# Patient Record
Sex: FEMALE | Race: WHITE | NOT HISPANIC OR LATINO | Employment: FULL TIME | URBAN - METROPOLITAN AREA
[De-identification: names, ages, dates, MRNs, and addresses within clinical notes are randomized per-mention and may not be internally consistent; named-entity substitution may affect disease eponyms.]

---

## 2023-11-20 DIAGNOSIS — E66.01 OBESITY, MORBID (MULTI): ICD-10-CM

## 2023-11-27 NOTE — PROGRESS NOTES
CLINICALLY SIGNIFICANT SLEEP DISORDER WAS NOT IDENTIFIED ON SLEEP STUDY FROM [facility]

## 2023-11-27 NOTE — PROGRESS NOTES
Pre-Operative Dietary Education     Current Weight:   Current BMI:     In class settings, reviewed thin liquids diet that patient will be required to follow for 2 weeks after surgery. Reviewed low calorie fluids along with protein shakes and products that can be consumed. Emphasized the importance of following diet guidelines and recommendations after surgery to prevent complications. Addressed liquids and items to avoid at this time. Patients are educated to drink at least 50 oz of fluid per day, and gradually take in 50g protein per day. Briefly reviewed the diet progression for the next 3-4 months, which will also be discussed at each follow up appointment after surgery. Also reviewed supplementation after bariatric surgery. Patient was instructed to take chewable MVI with iron once daily for the first 6 weeks after surgery. Other supplementation will be introduced at 6 week follow up appointment.       Loida Apple RD, LDN  Registered Dietitian, Licensed Dietitian Nutritionist

## 2023-11-28 ENCOUNTER — OFFICE VISIT (OUTPATIENT)
Dept: SURGERY | Facility: CLINIC | Age: 23
End: 2023-11-28
Payer: COMMERCIAL

## 2023-11-28 ENCOUNTER — CLINICAL SUPPORT (OUTPATIENT)
Dept: SURGERY | Facility: CLINIC | Age: 23
End: 2023-11-28
Payer: COMMERCIAL

## 2023-11-28 VITALS
WEIGHT: 288 LBS | RESPIRATION RATE: 16 BRPM | SYSTOLIC BLOOD PRESSURE: 126 MMHG | HEART RATE: 101 BPM | HEIGHT: 60 IN | DIASTOLIC BLOOD PRESSURE: 88 MMHG | BODY MASS INDEX: 56.54 KG/M2

## 2023-11-28 VITALS — BODY MASS INDEX: 56.72 KG/M2 | WEIGHT: 288 LBS

## 2023-11-28 DIAGNOSIS — I10 PRIMARY HYPERTENSION: ICD-10-CM

## 2023-11-28 DIAGNOSIS — Z71.9 ENCOUNTER FOR EDUCATION: Primary | ICD-10-CM

## 2023-11-28 DIAGNOSIS — Z01.818 PREOP EXAMINATION: ICD-10-CM

## 2023-11-28 DIAGNOSIS — J45.909 ASTHMA, UNSPECIFIED ASTHMA SEVERITY, UNSPECIFIED WHETHER COMPLICATED, UNSPECIFIED WHETHER PERSISTENT (HHS-HCC): ICD-10-CM

## 2023-11-28 PROCEDURE — 3074F SYST BP LT 130 MM HG: CPT | Performed by: INTERNAL MEDICINE

## 2023-11-28 PROCEDURE — 1036F TOBACCO NON-USER: CPT | Performed by: INTERNAL MEDICINE

## 2023-11-28 PROCEDURE — 3079F DIAST BP 80-89 MM HG: CPT | Performed by: INTERNAL MEDICINE

## 2023-11-28 PROCEDURE — 99024 POSTOP FOLLOW-UP VISIT: CPT

## 2023-11-28 PROCEDURE — 99213 OFFICE O/P EST LOW 20 MIN: CPT | Performed by: INTERNAL MEDICINE

## 2023-11-28 RX ORDER — ALBUTEROL SULFATE 90 UG/1
2 AEROSOL, METERED RESPIRATORY (INHALATION) EVERY 4 HOURS PRN
COMMUNITY
Start: 2016-08-10

## 2023-11-28 RX ORDER — METFORMIN HYDROCHLORIDE 500 MG/1
500 TABLET ORAL
COMMUNITY
End: 2023-12-12 | Stop reason: ALTCHOICE

## 2023-11-28 RX ORDER — PANTOPRAZOLE SODIUM 20 MG/1
20 TABLET, DELAYED RELEASE ORAL EVERY 24 HOURS
COMMUNITY
Start: 2022-02-24 | End: 2023-12-14 | Stop reason: HOSPADM

## 2023-11-28 RX ORDER — GUANFACINE 2 MG/1
2 TABLET, EXTENDED RELEASE ORAL
COMMUNITY
Start: 2023-11-24 | End: 2023-12-12 | Stop reason: ALTCHOICE

## 2023-11-28 ASSESSMENT — ENCOUNTER SYMPTOMS
ABDOMINAL PAIN: 0
SHORTNESS OF BREATH: 0
DIARRHEA: 0
DIZZINESS: 0
DEPRESSION: 0
OCCASIONAL FEELINGS OF UNSTEADINESS: 0
CONSTIPATION: 0
ARTHRALGIAS: 0
PALPITATIONS: 0
NAUSEA: 0
LOSS OF SENSATION IN FEET: 0
COUGH: 0

## 2023-11-28 ASSESSMENT — PATIENT HEALTH QUESTIONNAIRE - PHQ9
1. LITTLE INTEREST OR PLEASURE IN DOING THINGS: NOT AT ALL
SUM OF ALL RESPONSES TO PHQ9 QUESTIONS 1 AND 2: 0
2. FEELING DOWN, DEPRESSED OR HOPELESS: NOT AT ALL

## 2023-11-28 ASSESSMENT — PAIN SCALES - GENERAL: PAINLEVEL: 2

## 2023-11-28 NOTE — PROGRESS NOTES
Pre-Operative Dietary Education     Current Weight: 288 lbs   Current BMI: 56.72    In class settings, reviewed thin liquids diet that patient will be required to follow for 2 weeks after surgery. Reviewed low calorie fluids along with protein shakes and products that can be consumed. Emphasized the importance of following diet guidelines and recommendations after surgery to prevent complications. Addressed liquids and items to avoid at this time. Patients are educated to drink at least 50 oz of fluid per day, and gradually take in 50g protein per day. Briefly reviewed the diet progression for the next 3-4 months, which will also be discussed at each follow up appointment after surgery. Also reviewed supplementation after bariatric surgery. Patient was instructed to take chewable MVI with iron once daily for the first 6 weeks after surgery. Other supplementation will be introduced at 6 week follow up appointment.     Jennifer Chan, MS, RD, LD

## 2023-11-28 NOTE — PATIENT INSTRUCTIONS
Counseled on low calorie diet and regular exercise. Please discuss with your psychiatrist about Guanfacine to make sure is immediate release. The day before surgery take all meds as usually except no Metformin, take  no meds the day of surgery. She is medically clear for surgery pending lab results

## 2023-11-28 NOTE — PROGRESS NOTES
Subjective   Patient ID: Jennifer Lane is a 23 y.o. female who presents for preoperative clearance for gastric bypass surgery. Currently has 3 meals a day. Breakfast includes protein shakes. She is trying to limit carbs and have protein with each meal.  . She limits snacks. Drinks..  more water. . Regarding exercise, she walks for 30 min daily or uses her exercise bike.     Diagnostics Reviewed: 3/2023 EKG NSR, sleep study neg  Labs Reviewed: 3/3023 WNL         Review of Systems   Respiratory:  Negative for cough and shortness of breath.    Cardiovascular:  Negative for chest pain and palpitations.   Gastrointestinal:  Negative for abdominal pain, constipation, diarrhea and nausea.   Musculoskeletal:  Negative for arthralgias.   Neurological:  Negative for dizziness.       Objective   There were no vitals taken for this visit.    Physical Exam  Constitutional:       Appearance: She is obese.   HENT:      Mouth/Throat:      Comments: Dentition ok  Cardiovascular:      Rate and Rhythm: Normal rate and regular rhythm.   Pulmonary:      Breath sounds: Normal breath sounds.   Abdominal:      General: Bowel sounds are normal.      Palpations: Abdomen is soft.   Musculoskeletal:         General: No swelling.   Neurological:      Mental Status: She is alert.         Assessment/Plan   Diagnoses and all orders for this visit:  Preop examination  -     Comprehensive Metabolic Panel; Future  -     CBC and Auto Differential; Future  Primary hypertension  Asthma, unspecified asthma severity, unspecified whether complicated, unspecified whether persistent  -     Comprehensive Metabolic Panel; Future  -     CBC and Auto Differential; Future

## 2023-11-29 ENCOUNTER — LAB (OUTPATIENT)
Dept: LAB | Facility: LAB | Age: 23
End: 2023-11-29
Payer: COMMERCIAL

## 2023-11-29 DIAGNOSIS — Z01.818 PREOP EXAMINATION: ICD-10-CM

## 2023-11-29 DIAGNOSIS — J45.909 ASTHMA, UNSPECIFIED ASTHMA SEVERITY, UNSPECIFIED WHETHER COMPLICATED, UNSPECIFIED WHETHER PERSISTENT (HHS-HCC): ICD-10-CM

## 2023-11-29 LAB
ALBUMIN SERPL-MCNC: 4.3 G/DL (ref 3.5–5)
ALP BLD-CCNC: 87 U/L (ref 35–125)
ALT SERPL-CCNC: 11 U/L (ref 5–40)
ANION GAP SERPL CALC-SCNC: 10 MMOL/L
AST SERPL-CCNC: 12 U/L (ref 5–40)
BASOPHILS # BLD AUTO: 0.03 X10*3/UL (ref 0–0.1)
BASOPHILS NFR BLD AUTO: 0.5 %
BILIRUB SERPL-MCNC: 0.5 MG/DL (ref 0.1–1.2)
BUN SERPL-MCNC: 16 MG/DL (ref 8–25)
CALCIUM SERPL-MCNC: 9.4 MG/DL (ref 8.5–10.4)
CHLORIDE SERPL-SCNC: 102 MMOL/L (ref 97–107)
CO2 SERPL-SCNC: 28 MMOL/L (ref 24–31)
CREAT SERPL-MCNC: 0.7 MG/DL (ref 0.4–1.6)
EOSINOPHIL # BLD AUTO: 0.09 X10*3/UL (ref 0–0.7)
EOSINOPHIL NFR BLD AUTO: 1.6 %
ERYTHROCYTE [DISTWIDTH] IN BLOOD BY AUTOMATED COUNT: 12.7 % (ref 11.5–14.5)
GFR SERPL CREATININE-BSD FRML MDRD: >90 ML/MIN/1.73M*2
GLUCOSE SERPL-MCNC: 107 MG/DL (ref 65–99)
HCT VFR BLD AUTO: 42 % (ref 36–46)
HGB BLD-MCNC: 13.9 G/DL (ref 12–16)
IMM GRANULOCYTES # BLD AUTO: 0.01 X10*3/UL (ref 0–0.7)
IMM GRANULOCYTES NFR BLD AUTO: 0.2 % (ref 0–0.9)
LYMPHOCYTES # BLD AUTO: 2.76 X10*3/UL (ref 1.2–4.8)
LYMPHOCYTES NFR BLD AUTO: 48.9 %
MCH RBC QN AUTO: 30.1 PG (ref 26–34)
MCHC RBC AUTO-ENTMCNC: 33.1 G/DL (ref 32–36)
MCV RBC AUTO: 91 FL (ref 80–100)
MONOCYTES # BLD AUTO: 0.46 X10*3/UL (ref 0.1–1)
MONOCYTES NFR BLD AUTO: 8.2 %
NEUTROPHILS # BLD AUTO: 2.29 X10*3/UL (ref 1.2–7.7)
NEUTROPHILS NFR BLD AUTO: 40.6 %
NRBC BLD-RTO: 0 /100 WBCS (ref 0–0)
PLATELET # BLD AUTO: 330 X10*3/UL (ref 150–450)
POTASSIUM SERPL-SCNC: 4.1 MMOL/L (ref 3.4–5.1)
PROT SERPL-MCNC: 7.1 G/DL (ref 5.9–7.9)
RBC # BLD AUTO: 4.62 X10*6/UL (ref 4–5.2)
SODIUM SERPL-SCNC: 140 MMOL/L (ref 133–145)
WBC # BLD AUTO: 5.6 X10*3/UL (ref 4.4–11.3)

## 2023-11-29 PROCEDURE — 36415 COLL VENOUS BLD VENIPUNCTURE: CPT

## 2023-11-29 PROCEDURE — 80053 COMPREHEN METABOLIC PANEL: CPT

## 2023-11-29 PROCEDURE — 85025 COMPLETE CBC W/AUTO DIFF WBC: CPT

## 2023-12-13 ENCOUNTER — ANESTHESIA EVENT (OUTPATIENT)
Dept: OPERATING ROOM | Facility: HOSPITAL | Age: 23
DRG: 621 | End: 2023-12-13
Payer: COMMERCIAL

## 2023-12-13 ENCOUNTER — HOSPITAL ENCOUNTER (INPATIENT)
Facility: HOSPITAL | Age: 23
LOS: 1 days | Discharge: HOME | DRG: 621 | End: 2023-12-14
Attending: SURGERY | Admitting: SURGERY
Payer: COMMERCIAL

## 2023-12-13 ENCOUNTER — ANESTHESIA (OUTPATIENT)
Dept: OPERATING ROOM | Facility: HOSPITAL | Age: 23
DRG: 621 | End: 2023-12-13
Payer: COMMERCIAL

## 2023-12-13 DIAGNOSIS — J45.20 MILD INTERMITTENT ASTHMA WITHOUT COMPLICATION (HHS-HCC): ICD-10-CM

## 2023-12-13 DIAGNOSIS — E66.01 MORBID OBESITY WITH BMI OF 40.0-44.9, ADULT (MULTI): Primary | ICD-10-CM

## 2023-12-13 DIAGNOSIS — K44.9 HIATAL HERNIA: ICD-10-CM

## 2023-12-13 DIAGNOSIS — K21.9 GASTROESOPHAGEAL REFLUX DISEASE WITHOUT ESOPHAGITIS: ICD-10-CM

## 2023-12-13 DIAGNOSIS — E66.01 OBESITY, MORBID (MULTI): ICD-10-CM

## 2023-12-13 DIAGNOSIS — Z91.89 AT RISK FOR DEEP VENOUS THROMBOSIS: ICD-10-CM

## 2023-12-13 DIAGNOSIS — Z98.84 S/P BARIATRIC SURGERY: ICD-10-CM

## 2023-12-13 PROBLEM — J45.909 ASTHMA (HHS-HCC): Status: ACTIVE | Noted: 2023-12-13

## 2023-12-13 LAB
PLATELET # BLD AUTO: 390 X10*3/UL (ref 150–450)
PREGNANCY TEST URINE, POC: NEGATIVE

## 2023-12-13 PROCEDURE — 2500000004 HC RX 250 GENERAL PHARMACY W/ HCPCS (ALT 636 FOR OP/ED): Performed by: ANESTHESIOLOGIST ASSISTANT

## 2023-12-13 PROCEDURE — A43644 PR LAP GASTRIC BYPASS/ROUX-EN-Y: Performed by: ANESTHESIOLOGY

## 2023-12-13 PROCEDURE — 7100000001 HC RECOVERY ROOM TIME - INITIAL BASE CHARGE: Performed by: SURGERY

## 2023-12-13 PROCEDURE — 81025 URINE PREGNANCY TEST: CPT

## 2023-12-13 PROCEDURE — 3600000009 HC OR TIME - EACH INCREMENTAL 1 MINUTE - PROCEDURE LEVEL FOUR: Performed by: SURGERY

## 2023-12-13 PROCEDURE — 96372 THER/PROPH/DIAG INJ SC/IM: CPT

## 2023-12-13 PROCEDURE — 0BQT4ZZ REPAIR DIAPHRAGM, PERCUTANEOUS ENDOSCOPIC APPROACH: ICD-10-PCS | Performed by: SURGERY

## 2023-12-13 PROCEDURE — 1100000001 HC PRIVATE ROOM DAILY

## 2023-12-13 PROCEDURE — 2500000004 HC RX 250 GENERAL PHARMACY W/ HCPCS (ALT 636 FOR OP/ED)

## 2023-12-13 PROCEDURE — 3700000002 HC GENERAL ANESTHESIA TIME - EACH INCREMENTAL 1 MINUTE: Performed by: SURGERY

## 2023-12-13 PROCEDURE — 2500000005 HC RX 250 GENERAL PHARMACY W/O HCPCS: Performed by: INTERNAL MEDICINE

## 2023-12-13 PROCEDURE — 96372 THER/PROPH/DIAG INJ SC/IM: CPT | Performed by: SURGERY

## 2023-12-13 PROCEDURE — 2500000004 HC RX 250 GENERAL PHARMACY W/ HCPCS (ALT 636 FOR OP/ED): Performed by: SURGERY

## 2023-12-13 PROCEDURE — 3600000004 HC OR TIME - INITIAL BASE CHARGE - PROCEDURE LEVEL FOUR: Performed by: SURGERY

## 2023-12-13 PROCEDURE — 3700000001 HC GENERAL ANESTHESIA TIME - INITIAL BASE CHARGE: Performed by: SURGERY

## 2023-12-13 PROCEDURE — 7100000002 HC RECOVERY ROOM TIME - EACH INCREMENTAL 1 MINUTE: Performed by: SURGERY

## 2023-12-13 PROCEDURE — 0DB64Z3 EXCISION OF STOMACH, PERCUTANEOUS ENDOSCOPIC APPROACH, VERTICAL: ICD-10-PCS | Performed by: SURGERY

## 2023-12-13 PROCEDURE — 85049 AUTOMATED PLATELET COUNT: CPT

## 2023-12-13 PROCEDURE — A4649 SURGICAL SUPPLIES: HCPCS | Performed by: SURGERY

## 2023-12-13 PROCEDURE — C9113 INJ PANTOPRAZOLE SODIUM, VIA: HCPCS

## 2023-12-13 PROCEDURE — 2500000004 HC RX 250 GENERAL PHARMACY W/ HCPCS (ALT 636 FOR OP/ED): Performed by: SPECIALIST

## 2023-12-13 PROCEDURE — 43281 LAP PARAESOPHAG HERN REPAIR: CPT | Performed by: SURGERY

## 2023-12-13 PROCEDURE — 2500000005 HC RX 250 GENERAL PHARMACY W/O HCPCS: Performed by: SURGERY

## 2023-12-13 PROCEDURE — 99222 1ST HOSP IP/OBS MODERATE 55: CPT | Performed by: INTERNAL MEDICINE

## 2023-12-13 PROCEDURE — A43644 PR LAP GASTRIC BYPASS/ROUX-EN-Y: Performed by: ANESTHESIOLOGIST ASSISTANT

## 2023-12-13 PROCEDURE — 2720000007 HC OR 272 NO HCPCS: Performed by: SURGERY

## 2023-12-13 PROCEDURE — 43644 LAP GASTRIC BYPASS/ROUX-EN-Y: CPT | Performed by: SURGERY

## 2023-12-13 PROCEDURE — 36415 COLL VENOUS BLD VENIPUNCTURE: CPT

## 2023-12-13 PROCEDURE — 2500000005 HC RX 250 GENERAL PHARMACY W/O HCPCS: Performed by: ANESTHESIOLOGIST ASSISTANT

## 2023-12-13 RX ORDER — SODIUM CHLORIDE, SODIUM LACTATE, POTASSIUM CHLORIDE, CALCIUM CHLORIDE 600; 310; 30; 20 MG/100ML; MG/100ML; MG/100ML; MG/100ML
100 INJECTION, SOLUTION INTRAVENOUS CONTINUOUS
Status: DISCONTINUED | OUTPATIENT
Start: 2023-12-13 | End: 2023-12-13

## 2023-12-13 RX ORDER — LIDOCAINE HYDROCHLORIDE 10 MG/ML
0.1 INJECTION INFILTRATION; PERINEURAL ONCE
Status: DISCONTINUED | OUTPATIENT
Start: 2023-12-13 | End: 2023-12-13 | Stop reason: HOSPADM

## 2023-12-13 RX ORDER — FENTANYL CITRATE 50 UG/ML
25 INJECTION, SOLUTION INTRAMUSCULAR; INTRAVENOUS EVERY 5 MIN PRN
Status: DISCONTINUED | OUTPATIENT
Start: 2023-12-13 | End: 2023-12-13 | Stop reason: HOSPADM

## 2023-12-13 RX ORDER — SODIUM CHLORIDE, SODIUM LACTATE, POTASSIUM CHLORIDE, CALCIUM CHLORIDE 600; 310; 30; 20 MG/100ML; MG/100ML; MG/100ML; MG/100ML
125 INJECTION, SOLUTION INTRAVENOUS CONTINUOUS
Status: DISCONTINUED | OUTPATIENT
Start: 2023-12-13 | End: 2023-12-14 | Stop reason: HOSPADM

## 2023-12-13 RX ORDER — DIPHENHYDRAMINE HYDROCHLORIDE 50 MG/ML
12.5 INJECTION INTRAMUSCULAR; INTRAVENOUS ONCE AS NEEDED
Status: DISCONTINUED | OUTPATIENT
Start: 2023-12-13 | End: 2023-12-13 | Stop reason: HOSPADM

## 2023-12-13 RX ORDER — ONDANSETRON HYDROCHLORIDE 2 MG/ML
INJECTION, SOLUTION INTRAVENOUS AS NEEDED
Status: DISCONTINUED | OUTPATIENT
Start: 2023-12-13 | End: 2023-12-13

## 2023-12-13 RX ORDER — HEPARIN SODIUM 5000 [USP'U]/ML
5000 INJECTION, SOLUTION INTRAVENOUS; SUBCUTANEOUS ONCE
Status: COMPLETED | OUTPATIENT
Start: 2023-12-13 | End: 2023-12-13

## 2023-12-13 RX ORDER — LIDOCAINE HYDROCHLORIDE 10 MG/ML
INJECTION, SOLUTION EPIDURAL; INFILTRATION; INTRACAUDAL; PERINEURAL AS NEEDED
Status: DISCONTINUED | OUTPATIENT
Start: 2023-12-13 | End: 2023-12-13

## 2023-12-13 RX ORDER — HYDRALAZINE HYDROCHLORIDE 20 MG/ML
5 INJECTION INTRAMUSCULAR; INTRAVENOUS EVERY 4 HOURS PRN
Status: DISCONTINUED | OUTPATIENT
Start: 2023-12-13 | End: 2023-12-13

## 2023-12-13 RX ORDER — PROPOFOL 10 MG/ML
INJECTION, EMULSION INTRAVENOUS AS NEEDED
Status: DISCONTINUED | OUTPATIENT
Start: 2023-12-13 | End: 2023-12-13

## 2023-12-13 RX ORDER — CEFAZOLIN SODIUM IN 0.9 % NACL 3 G/100 ML
3 INTRAVENOUS SOLUTION, PIGGYBACK (ML) INTRAVENOUS ONCE
Status: COMPLETED | OUTPATIENT
Start: 2023-12-13 | End: 2023-12-13

## 2023-12-13 RX ORDER — METOCLOPRAMIDE HYDROCHLORIDE 5 MG/ML
10 INJECTION INTRAMUSCULAR; INTRAVENOUS EVERY 6 HOURS PRN
Status: DISCONTINUED | OUTPATIENT
Start: 2023-12-13 | End: 2023-12-14 | Stop reason: HOSPADM

## 2023-12-13 RX ORDER — FENTANYL CITRATE 50 UG/ML
INJECTION, SOLUTION INTRAMUSCULAR; INTRAVENOUS AS NEEDED
Status: DISCONTINUED | OUTPATIENT
Start: 2023-12-13 | End: 2023-12-13

## 2023-12-13 RX ORDER — ACETAMINOPHEN 10 MG/ML
1000 INJECTION, SOLUTION INTRAVENOUS EVERY 6 HOURS
Status: DISCONTINUED | OUTPATIENT
Start: 2023-12-13 | End: 2023-12-14

## 2023-12-13 RX ORDER — METOPROLOL TARTRATE 1 MG/ML
5 INJECTION, SOLUTION INTRAVENOUS EVERY 6 HOURS SCHEDULED
Status: DISCONTINUED | OUTPATIENT
Start: 2023-12-13 | End: 2023-12-14

## 2023-12-13 RX ORDER — METOPROLOL TARTRATE 1 MG/ML
INJECTION, SOLUTION INTRAVENOUS AS NEEDED
Status: DISCONTINUED | OUTPATIENT
Start: 2023-12-13 | End: 2023-12-13

## 2023-12-13 RX ORDER — LABETALOL HYDROCHLORIDE 5 MG/ML
INJECTION, SOLUTION INTRAVENOUS AS NEEDED
Status: DISCONTINUED | OUTPATIENT
Start: 2023-12-13 | End: 2023-12-13

## 2023-12-13 RX ORDER — DEXAMETHASONE SODIUM PHOSPHATE 4 MG/ML
INJECTION, SOLUTION INTRA-ARTICULAR; INTRALESIONAL; INTRAMUSCULAR; INTRAVENOUS; SOFT TISSUE AS NEEDED
Status: DISCONTINUED | OUTPATIENT
Start: 2023-12-13 | End: 2023-12-13

## 2023-12-13 RX ORDER — LABETALOL HYDROCHLORIDE 5 MG/ML
5 INJECTION, SOLUTION INTRAVENOUS ONCE AS NEEDED
Status: DISCONTINUED | OUTPATIENT
Start: 2023-12-13 | End: 2023-12-13 | Stop reason: HOSPADM

## 2023-12-13 RX ORDER — SCOLOPAMINE TRANSDERMAL SYSTEM 1 MG/1
1 PATCH, EXTENDED RELEASE TRANSDERMAL ONCE
Status: DISCONTINUED | OUTPATIENT
Start: 2023-12-13 | End: 2023-12-13

## 2023-12-13 RX ORDER — ENOXAPARIN SODIUM 100 MG/ML
40 INJECTION SUBCUTANEOUS DAILY
Status: DISCONTINUED | OUTPATIENT
Start: 2023-12-14 | End: 2023-12-14 | Stop reason: HOSPADM

## 2023-12-13 RX ORDER — HEPARIN SODIUM 5000 [USP'U]/ML
5000 INJECTION, SOLUTION INTRAVENOUS; SUBCUTANEOUS EVERY 8 HOURS SCHEDULED
Status: COMPLETED | OUTPATIENT
Start: 2023-12-13 | End: 2023-12-13

## 2023-12-13 RX ORDER — BUPRENORPHINE HYDROCHLORIDE 0.32 MG/ML
0.1 INJECTION INTRAMUSCULAR; INTRAVENOUS EVERY 6 HOURS PRN
Status: DISCONTINUED | OUTPATIENT
Start: 2023-12-13 | End: 2023-12-14 | Stop reason: HOSPADM

## 2023-12-13 RX ORDER — PANTOPRAZOLE SODIUM 40 MG/10ML
40 INJECTION, POWDER, LYOPHILIZED, FOR SOLUTION INTRAVENOUS DAILY
Status: DISCONTINUED | OUTPATIENT
Start: 2023-12-14 | End: 2023-12-14 | Stop reason: HOSPADM

## 2023-12-13 RX ORDER — ROCURONIUM BROMIDE 10 MG/ML
INJECTION, SOLUTION INTRAVENOUS AS NEEDED
Status: DISCONTINUED | OUTPATIENT
Start: 2023-12-13 | End: 2023-12-13

## 2023-12-13 RX ORDER — NEOSTIGMINE METHYLSULFATE 1 MG/ML
INJECTION, SOLUTION INTRAVENOUS AS NEEDED
Status: DISCONTINUED | OUTPATIENT
Start: 2023-12-13 | End: 2023-12-13

## 2023-12-13 RX ORDER — ONDANSETRON HYDROCHLORIDE 2 MG/ML
4 INJECTION, SOLUTION INTRAVENOUS ONCE AS NEEDED
Status: DISCONTINUED | OUTPATIENT
Start: 2023-12-13 | End: 2023-12-13 | Stop reason: HOSPADM

## 2023-12-13 RX ORDER — METOCLOPRAMIDE HYDROCHLORIDE 5 MG/ML
10 INJECTION INTRAMUSCULAR; INTRAVENOUS ONCE
Status: COMPLETED | OUTPATIENT
Start: 2023-12-13 | End: 2023-12-13

## 2023-12-13 RX ORDER — NALOXONE HYDROCHLORIDE 0.4 MG/ML
0.2 INJECTION, SOLUTION INTRAMUSCULAR; INTRAVENOUS; SUBCUTANEOUS EVERY 5 MIN PRN
Status: DISCONTINUED | OUTPATIENT
Start: 2023-12-13 | End: 2023-12-14 | Stop reason: HOSPADM

## 2023-12-13 RX ORDER — GLYCOPYRROLATE 0.2 MG/ML
INJECTION INTRAMUSCULAR; INTRAVENOUS AS NEEDED
Status: DISCONTINUED | OUTPATIENT
Start: 2023-12-13 | End: 2023-12-13

## 2023-12-13 RX ORDER — HYDRALAZINE HYDROCHLORIDE 20 MG/ML
5 INJECTION INTRAMUSCULAR; INTRAVENOUS EVERY 4 HOURS PRN
Status: DISCONTINUED | OUTPATIENT
Start: 2023-12-13 | End: 2023-12-14 | Stop reason: HOSPADM

## 2023-12-13 RX ORDER — ACETAMINOPHEN 10 MG/ML
1000 INJECTION, SOLUTION INTRAVENOUS ONCE
Status: COMPLETED | OUTPATIENT
Start: 2023-12-13 | End: 2023-12-13

## 2023-12-13 RX ORDER — PANTOPRAZOLE SODIUM 40 MG/10ML
40 INJECTION, POWDER, LYOPHILIZED, FOR SOLUTION INTRAVENOUS ONCE
Status: COMPLETED | OUTPATIENT
Start: 2023-12-13 | End: 2023-12-13

## 2023-12-13 RX ORDER — ONDANSETRON HYDROCHLORIDE 2 MG/ML
4 INJECTION, SOLUTION INTRAVENOUS EVERY 8 HOURS PRN
Status: DISCONTINUED | OUTPATIENT
Start: 2023-12-13 | End: 2023-12-14 | Stop reason: HOSPADM

## 2023-12-13 RX ORDER — SODIUM CHLORIDE, SODIUM LACTATE, POTASSIUM CHLORIDE, CALCIUM CHLORIDE 600; 310; 30; 20 MG/100ML; MG/100ML; MG/100ML; MG/100ML
100 INJECTION, SOLUTION INTRAVENOUS CONTINUOUS
Status: DISCONTINUED | OUTPATIENT
Start: 2023-12-13 | End: 2023-12-13 | Stop reason: HOSPADM

## 2023-12-13 RX ORDER — MIDAZOLAM HYDROCHLORIDE 1 MG/ML
INJECTION, SOLUTION INTRAMUSCULAR; INTRAVENOUS AS NEEDED
Status: DISCONTINUED | OUTPATIENT
Start: 2023-12-13 | End: 2023-12-13

## 2023-12-13 RX ADMIN — GLYCOPYRROLATE 0.4 MG: 0.2 INJECTION INTRAMUSCULAR; INTRAVENOUS at 15:43

## 2023-12-13 RX ADMIN — METOCLOPRAMIDE 10 MG: 5 INJECTION, SOLUTION INTRAMUSCULAR; INTRAVENOUS at 10:30

## 2023-12-13 RX ADMIN — SODIUM CHLORIDE, SODIUM LACTATE, POTASSIUM CHLORIDE, AND CALCIUM CHLORIDE 100 ML/HR: 600; 310; 30; 20 INJECTION, SOLUTION INTRAVENOUS at 10:27

## 2023-12-13 RX ADMIN — FENTANYL CITRATE 50 MCG: 50 INJECTION, SOLUTION INTRAMUSCULAR; INTRAVENOUS at 14:14

## 2023-12-13 RX ADMIN — HEPARIN SODIUM 5000 UNITS: 5000 INJECTION, SOLUTION INTRAVENOUS; SUBCUTANEOUS at 21:13

## 2023-12-13 RX ADMIN — LABETALOL HYDROCHLORIDE 5 MG: 5 INJECTION, SOLUTION INTRAVENOUS at 15:02

## 2023-12-13 RX ADMIN — NEOSTIGMINE METHYLSULFATE 2 MG: 1 INJECTION, SOLUTION INTRAVENOUS at 15:43

## 2023-12-13 RX ADMIN — FENTANYL CITRATE 25 MCG: 50 INJECTION, SOLUTION INTRAMUSCULAR; INTRAVENOUS at 15:54

## 2023-12-13 RX ADMIN — FENTANYL CITRATE 25 MCG: 50 INJECTION, SOLUTION INTRAMUSCULAR; INTRAVENOUS at 15:51

## 2023-12-13 RX ADMIN — ROCURONIUM BROMIDE 10 MG: 10 INJECTION, SOLUTION INTRAVENOUS at 13:41

## 2023-12-13 RX ADMIN — MIDAZOLAM 1 MG: 1 INJECTION INTRAMUSCULAR; INTRAVENOUS at 11:56

## 2023-12-13 RX ADMIN — FENTANYL CITRATE 50 MCG: 50 INJECTION, SOLUTION INTRAMUSCULAR; INTRAVENOUS at 14:49

## 2023-12-13 RX ADMIN — PANTOPRAZOLE SODIUM 40 MG: 40 INJECTION, POWDER, FOR SOLUTION INTRAVENOUS at 10:28

## 2023-12-13 RX ADMIN — ROCURONIUM BROMIDE 50 MG: 10 INJECTION, SOLUTION INTRAVENOUS at 11:59

## 2023-12-13 RX ADMIN — LABETALOL HYDROCHLORIDE 5 MG: 5 INJECTION, SOLUTION INTRAVENOUS at 15:28

## 2023-12-13 RX ADMIN — LIDOCAINE HYDROCHLORIDE 5 ML: 10 INJECTION, SOLUTION EPIDURAL; INFILTRATION; INTRACAUDAL; PERINEURAL at 11:53

## 2023-12-13 RX ADMIN — FENTANYL CITRATE 50 MCG: 50 INJECTION, SOLUTION INTRAMUSCULAR; INTRAVENOUS at 14:59

## 2023-12-13 RX ADMIN — FENTANYL CITRATE 50 MCG: 50 INJECTION, SOLUTION INTRAMUSCULAR; INTRAVENOUS at 13:43

## 2023-12-13 RX ADMIN — METOPROLOL TARTRATE 3 MG: 5 INJECTION, SOLUTION INTRAVENOUS at 13:47

## 2023-12-13 RX ADMIN — ROCURONIUM BROMIDE 5 MG: 10 INJECTION, SOLUTION INTRAVENOUS at 15:22

## 2023-12-13 RX ADMIN — FENTANYL CITRATE 25 MCG: 0.05 INJECTION, SOLUTION INTRAMUSCULAR; INTRAVENOUS at 17:00

## 2023-12-13 RX ADMIN — SODIUM CHLORIDE, SODIUM LACTATE, POTASSIUM CHLORIDE, AND CALCIUM CHLORIDE: 600; 310; 30; 20 INJECTION, SOLUTION INTRAVENOUS at 13:29

## 2023-12-13 RX ADMIN — METOPROLOL TARTRATE 2 MG: 5 INJECTION, SOLUTION INTRAVENOUS at 13:25

## 2023-12-13 RX ADMIN — FENTANYL CITRATE 25 MCG: 0.05 INJECTION, SOLUTION INTRAMUSCULAR; INTRAVENOUS at 16:32

## 2023-12-13 RX ADMIN — FENTANYL CITRATE 25 MCG: 0.05 INJECTION, SOLUTION INTRAMUSCULAR; INTRAVENOUS at 16:25

## 2023-12-13 RX ADMIN — LABETALOL HYDROCHLORIDE 5 MG: 5 INJECTION, SOLUTION INTRAVENOUS at 15:33

## 2023-12-13 RX ADMIN — METOPROLOL TARTRATE 5 MG: 5 INJECTION INTRAVENOUS at 18:04

## 2023-12-13 RX ADMIN — NEOSTIGMINE METHYLSULFATE 3 MG: 1 INJECTION, SOLUTION INTRAVENOUS at 15:46

## 2023-12-13 RX ADMIN — FENTANYL CITRATE 25 MCG: 0.05 INJECTION, SOLUTION INTRAMUSCULAR; INTRAVENOUS at 16:55

## 2023-12-13 RX ADMIN — SCOPALAMINE 1 PATCH: 1 PATCH, EXTENDED RELEASE TRANSDERMAL at 10:32

## 2023-12-13 RX ADMIN — DEXAMETHASONE SODIUM PHOSPHATE 8 MG: 4 INJECTION, SOLUTION INTRA-ARTICULAR; INTRALESIONAL; INTRAMUSCULAR; INTRAVENOUS; SOFT TISSUE at 12:29

## 2023-12-13 RX ADMIN — PROPOFOL 200 MG: 10 INJECTION, EMULSION INTRAVENOUS at 11:59

## 2023-12-13 RX ADMIN — Medication 3 G: at 12:03

## 2023-12-13 RX ADMIN — ONDANSETRON HYDROCHLORIDE 4 MG: 2 INJECTION INTRAMUSCULAR; INTRAVENOUS at 15:40

## 2023-12-13 RX ADMIN — ROCURONIUM BROMIDE 20 MG: 10 INJECTION, SOLUTION INTRAVENOUS at 14:08

## 2023-12-13 RX ADMIN — Medication 2 L/MIN: at 18:00

## 2023-12-13 RX ADMIN — FENTANYL CITRATE 100 MCG: 50 INJECTION, SOLUTION INTRAMUSCULAR; INTRAVENOUS at 11:59

## 2023-12-13 RX ADMIN — GLYCOPYRROLATE 0.6 MG: 0.2 INJECTION INTRAMUSCULAR; INTRAVENOUS at 15:46

## 2023-12-13 RX ADMIN — ACETAMINOPHEN 1000 MG: 10 INJECTION INTRAVENOUS at 10:38

## 2023-12-13 RX ADMIN — SUGAMMADEX 100 MG: 100 INJECTION, SOLUTION INTRAVENOUS at 15:54

## 2023-12-13 RX ADMIN — ROCURONIUM BROMIDE 5 MG: 10 INJECTION, SOLUTION INTRAVENOUS at 14:56

## 2023-12-13 RX ADMIN — METOCLOPRAMIDE 10 MG: 5 INJECTION, SOLUTION INTRAMUSCULAR; INTRAVENOUS at 18:51

## 2023-12-13 RX ADMIN — BUPRENORPHINE HYDROCHLORIDE 0.1 MG: 0.3 INJECTION INTRAMUSCULAR; INTRAVENOUS at 18:27

## 2023-12-13 RX ADMIN — MIDAZOLAM 1 MG: 1 INJECTION INTRAMUSCULAR; INTRAVENOUS at 11:53

## 2023-12-13 RX ADMIN — FENTANYL CITRATE 50 MCG: 50 INJECTION, SOLUTION INTRAMUSCULAR; INTRAVENOUS at 12:59

## 2023-12-13 RX ADMIN — ACETAMINOPHEN 1000 MG: 10 INJECTION INTRAVENOUS at 18:04

## 2023-12-13 RX ADMIN — HEPARIN SODIUM 5000 UNITS: 5000 INJECTION, SOLUTION INTRAVENOUS; SUBCUTANEOUS at 10:35

## 2023-12-13 RX ADMIN — SODIUM CHLORIDE, SODIUM LACTATE, POTASSIUM CHLORIDE, AND CALCIUM CHLORIDE 125 ML/HR: 600; 310; 30; 20 INJECTION, SOLUTION INTRAVENOUS at 17:59

## 2023-12-13 RX ADMIN — ROCURONIUM BROMIDE 20 MG: 10 INJECTION, SOLUTION INTRAVENOUS at 12:27

## 2023-12-13 RX ADMIN — ROCURONIUM BROMIDE 20 MG: 10 INJECTION, SOLUTION INTRAVENOUS at 13:05

## 2023-12-13 SDOH — SOCIAL STABILITY: SOCIAL INSECURITY: HAS ANYONE EVER THREATENED TO HURT YOUR FAMILY OR YOUR PETS?: NO

## 2023-12-13 SDOH — SOCIAL STABILITY: SOCIAL INSECURITY: WERE YOU ABLE TO COMPLETE ALL THE BEHAVIORAL HEALTH SCREENINGS?: YES

## 2023-12-13 SDOH — SOCIAL STABILITY: SOCIAL INSECURITY: ABUSE: ADULT

## 2023-12-13 SDOH — SOCIAL STABILITY: SOCIAL INSECURITY: ARE YOU OR HAVE YOU BEEN THREATENED OR ABUSED PHYSICALLY, EMOTIONALLY, OR SEXUALLY BY ANYONE?: NO

## 2023-12-13 SDOH — SOCIAL STABILITY: SOCIAL INSECURITY: HAVE YOU HAD THOUGHTS OF HARMING ANYONE ELSE?: NO

## 2023-12-13 SDOH — HEALTH STABILITY: MENTAL HEALTH: CURRENT SMOKER: 0

## 2023-12-13 SDOH — SOCIAL STABILITY: SOCIAL INSECURITY: ARE THERE ANY APPARENT SIGNS OF INJURIES/BEHAVIORS THAT COULD BE RELATED TO ABUSE/NEGLECT?: NO

## 2023-12-13 SDOH — SOCIAL STABILITY: SOCIAL INSECURITY: DO YOU FEEL UNSAFE GOING BACK TO THE PLACE WHERE YOU ARE LIVING?: NO

## 2023-12-13 SDOH — SOCIAL STABILITY: SOCIAL INSECURITY: DO YOU FEEL ANYONE HAS EXPLOITED OR TAKEN ADVANTAGE OF YOU FINANCIALLY OR OF YOUR PERSONAL PROPERTY?: NO

## 2023-12-13 SDOH — SOCIAL STABILITY: SOCIAL INSECURITY: DOES ANYONE TRY TO KEEP YOU FROM HAVING/CONTACTING OTHER FRIENDS OR DOING THINGS OUTSIDE YOUR HOME?: NO

## 2023-12-13 ASSESSMENT — COGNITIVE AND FUNCTIONAL STATUS - GENERAL
PATIENT BASELINE BEDBOUND: NO
MOBILITY SCORE: 24
MOBILITY SCORE: 24
DAILY ACTIVITIY SCORE: 24
DAILY ACTIVITIY SCORE: 24

## 2023-12-13 ASSESSMENT — PAIN SCALES - GENERAL
PAINLEVEL_OUTOF10: 4
PAINLEVEL_OUTOF10: 7
PAINLEVEL_OUTOF10: 4
PAINLEVEL_OUTOF10: 4
PAINLEVEL_OUTOF10: 7
PAINLEVEL_OUTOF10: 3
PAINLEVEL_OUTOF10: 4
PAINLEVEL_OUTOF10: 0 - NO PAIN
PAINLEVEL_OUTOF10: 0 - NO PAIN
PAINLEVEL_OUTOF10: 7
PAINLEVEL_OUTOF10: 4
PAINLEVEL_OUTOF10: 4
PAINLEVEL_OUTOF10: 8
PAINLEVEL_OUTOF10: 7
PAIN_LEVEL: 2
PAINLEVEL_OUTOF10: 4

## 2023-12-13 ASSESSMENT — ENCOUNTER SYMPTOMS
CONSTIPATION: 0
DIFFICULTY URINATING: 0
NAUSEA: 0
PALPITATIONS: 0
ABDOMINAL PAIN: 0
ARTHRALGIAS: 0
FEVER: 0
NERVOUS/ANXIOUS: 0
SINUS PAIN: 0
HEADACHES: 0
DIZZINESS: 0
SLEEP DISTURBANCE: 0
RHINORRHEA: 0
COUGH: 0
VOMITING: 0
APPETITE CHANGE: 0
FATIGUE: 0
HEMATURIA: 0
WEAKNESS: 0
JOINT SWELLING: 0
DIARRHEA: 0
SORE THROAT: 0
SHORTNESS OF BREATH: 0
CHILLS: 0
FREQUENCY: 0

## 2023-12-13 ASSESSMENT — PAIN - FUNCTIONAL ASSESSMENT
PAIN_FUNCTIONAL_ASSESSMENT: 0-10
PAIN_FUNCTIONAL_ASSESSMENT: FLACC (FACE, LEGS, ACTIVITY, CRY, CONSOLABILITY)
PAIN_FUNCTIONAL_ASSESSMENT: 0-10
PAIN_FUNCTIONAL_ASSESSMENT: FLACC (FACE, LEGS, ACTIVITY, CRY, CONSOLABILITY)
PAIN_FUNCTIONAL_ASSESSMENT: FLACC (FACE, LEGS, ACTIVITY, CRY, CONSOLABILITY)
PAIN_FUNCTIONAL_ASSESSMENT: 0-10
PAIN_FUNCTIONAL_ASSESSMENT: FLACC (FACE, LEGS, ACTIVITY, CRY, CONSOLABILITY)
PAIN_FUNCTIONAL_ASSESSMENT: 0-10

## 2023-12-13 ASSESSMENT — COLUMBIA-SUICIDE SEVERITY RATING SCALE - C-SSRS
2. HAVE YOU ACTUALLY HAD ANY THOUGHTS OF KILLING YOURSELF?: NO
6. HAVE YOU EVER DONE ANYTHING, STARTED TO DO ANYTHING, OR PREPARED TO DO ANYTHING TO END YOUR LIFE?: NO
1. IN THE PAST MONTH, HAVE YOU WISHED YOU WERE DEAD OR WISHED YOU COULD GO TO SLEEP AND NOT WAKE UP?: NO

## 2023-12-13 ASSESSMENT — ACTIVITIES OF DAILY LIVING (ADL)
GROOMING: INDEPENDENT
BATHING: INDEPENDENT
ADEQUATE_TO_COMPLETE_ADL: YES
TOILETING: INDEPENDENT
WALKS IN HOME: INDEPENDENT
FEEDING YOURSELF: INDEPENDENT
HEARING - RIGHT EAR: FUNCTIONAL
LACK_OF_TRANSPORTATION: NO
HEARING - LEFT EAR: FUNCTIONAL
JUDGMENT_ADEQUATE_SAFELY_COMPLETE_DAILY_ACTIVITIES: YES
DRESSING YOURSELF: INDEPENDENT
PATIENT'S MEMORY ADEQUATE TO SAFELY COMPLETE DAILY ACTIVITIES?: YES

## 2023-12-13 ASSESSMENT — LIFESTYLE VARIABLES
AUDIT-C TOTAL SCORE: 0
HOW MANY STANDARD DRINKS CONTAINING ALCOHOL DO YOU HAVE ON A TYPICAL DAY: PATIENT DOES NOT DRINK
AUDIT-C TOTAL SCORE: 0
SUBSTANCE_ABUSE_PAST_12_MONTHS: NO
HOW OFTEN DO YOU HAVE 6 OR MORE DRINKS ON ONE OCCASION: NEVER
PRESCIPTION_ABUSE_PAST_12_MONTHS: NO
HOW OFTEN DO YOU HAVE A DRINK CONTAINING ALCOHOL: NEVER
SKIP TO QUESTIONS 9-10: 1

## 2023-12-13 ASSESSMENT — PAIN DESCRIPTION - LOCATION
LOCATION: ABDOMEN

## 2023-12-13 ASSESSMENT — PATIENT HEALTH QUESTIONNAIRE - PHQ9
1. LITTLE INTEREST OR PLEASURE IN DOING THINGS: NOT AT ALL
2. FEELING DOWN, DEPRESSED OR HOPELESS: NOT AT ALL
SUM OF ALL RESPONSES TO PHQ9 QUESTIONS 1 & 2: 0

## 2023-12-13 NOTE — ANESTHESIA PROCEDURE NOTES
Peripheral IV  Date/Time: 12/13/2023 12:06 PM      Placement  Needle size: 20 G  Laterality: left  Location: forearm  Local anesthetic: none  Site prep: alcohol  Technique: anatomical landmarks  Attempts: 1

## 2023-12-13 NOTE — INTERVAL H&P NOTE
April 30, 2018         Patient: Khadijah Velasquez   YOB: 2003   Date of Visit: 4/30/2018           To Whom it May Concern:    Khadijah Velasquez was seen in my clinic on 4/30/2018. She may return to school on 5/2/18.    If you have any questions or concerns, please don't hesitate to call.        Sincerely,           Adriana Fernandez P.A.-C.  Electronically Signed      H&P reviewed. The patient was examined and there are no changes to the H&P.

## 2023-12-13 NOTE — ANESTHESIA POSTPROCEDURE EVALUATION
Patient: Jennifer Lane    Procedure Summary       Date: 12/13/23 Room / Location: [facility] OR 11 / Virtual [facility] OR    Anesthesia Start: 1148 Anesthesia Stop: 1607    Procedures:       Gastric Bypass Laparoscopic (Abdomen)      Repair Diaphragmatic Hernia Laparoscopy (Abdomen) Diagnosis:       Obesity, morbid (CMS/HCC)      Hiatal hernia      Gastroesophageal reflux disease, unspecified whether esophagitis present      (Obesity, morbid (CMS/HCC) [E66.01])    Surgeons: Cameron Everett MD Responsible Provider: Gonzalo Ricks MD    Anesthesia Type: general ASA Status: 3            Anesthesia Type: general    Vitals Value Taken Time   /87 12/13/23 1651   Temp 36.3 °C (97.3 °F) 12/13/23 1605   Pulse 75 12/13/23 1652   Resp 18 12/13/23 1652   SpO2 100 % 12/13/23 1652   Vitals shown include unvalidated device data.    Anesthesia Post Evaluation    Patient location during evaluation: PACU  Patient participation: complete - patient participated  Level of consciousness: awake  Pain score: 2  Pain management: adequate  Multimodal analgesia pain management approach  Airway patency: patent  Two or more strategies used to mitigate risk of obstructive sleep apnea  Cardiovascular status: acceptable  Respiratory status: acceptable  Hydration status: acceptable  Postoperative Nausea and Vomiting: none        There were no known notable events for this encounter.

## 2023-12-13 NOTE — OP NOTE
Gastric Bypass Laparoscopic, Repair Diaphragmatic Hernia Laparoscopy Operative Note     Date: 2023  OR Location: [facility] OR    Name: Jennifer Lane, : 2000, Age: 23 y.o., MRN: [id number], Sex: female    Diagnosis  Pre-op Diagnosis     * Obesity, morbid (CMS/HCC) [E66.01]     * Gastroesophageal reflux disease, unspecified whether esophagitis present [K21.9] Post-op Diagnosis     * Obesity, morbid (CMS/HCC) [E66.01]     * Hiatal hernia [K44.9]     * Gastroesophageal reflux disease, unspecified whether esophagitis present [K21.9]     Procedures  Gastric Bypass Laparoscopic  87193 - AL LAPS GSTR RSTCV PX W/BYP SHAQUILLE-EN-Y LIMB <150 CM    Repair Diaphragmatic Hernia Laparoscopy      Surgeons      * Cameron Everett - Primary    Resident/Fellow/Other Assistant:  Surgeon(s) and Role:    Procedure Summary  Anesthesia: General  ASA: III  Anesthesia Staff: Anesthesiologist: Gonzalo Ricks MD  C-AA: EDMAR Tran  Estimated Blood Loss: 0mL  Intra-op Medications:   Medication Name Total Dose   BUPivacaine HCl (Marcaine) 0.5 % (5 mg/mL) 30 mL, lidocaine PF (Xylocaine) 30 mL in sodium chloride 0.9% 60 mL syringe 120 mL   lactated Ringer's infusion 201.67 mL   ceFAZolin in 0.9% sod chloride (Ancef) IVPB 3 g 3 g              Anesthesia Record               Intraprocedure I/O Totals          Intake    lactated Ringer's infusion 1300.00 mL    Total Intake 1300 mL          Specimen: No specimens collected     Staff:   Circulator: Park Puri RN; Yuko Harris RN  Relief Circulator: Keira Garcia RN  Scrub Person: Lelo Putnam  RNFA: Beatriz Tyler         Findings: moderate sized hiatal hernia, no leak    Indications: Jennifer Lane is an 23 y.o. woman that weighs 296 pounds and has a body mass index of 58.  She has failed maximal medical attempts of weight loss prompting referral for surgical intervention.  She was to proceed with a Shaquille-en-Y gastric bypass due to her gastroesophageal  reflux.  Risks and benefits were explained in extensive detail including but not limited to death, DVT, PE, bleeding, GI tract injury or leak, inadequate weight loss, and need for lifelong nutritional supplementation, lifelong risk of gastrojejunal ulcer, need for lifestyle modification for long-term success.  Consent was obtained.  The colon and her parents were seen in the preoperative area. The risks, benefits, complications, treatment options, non-operative alternatives, expected recovery and outcomes were discussed with the patient. The possibilities of reaction to medication, pulmonary aspiration, injury to surrounding structures, bleeding, recurrent infection, the need for additional procedures, failure to diagnose a condition, and creating a complication requiring transfusion or operation were discussed with the patient. The patient concurred with the proposed plan, giving informed consent.  The site of surgery was properly noted/marked if necessary per policy. The patient has been actively warmed in preoperative area. Preoperative antibiotics have been ordered and given within 1 hours of incision. Venous thrombosis prophylaxis have been ordered including bilateral sequential compression devices and chemical prophylaxis    Procedure Details: Jennifer was given subcutaneous heparin and antibiotics in the preoperative holding area.  She was brought to the operating room where preoperative huddle was completed.  She was then placed on the operating room table.  Sequential compression devices were placed.  She was placed in a split position.  Her abdomen was prepped and draped in a sterile fashion.  I entered the abdomen in the left midclavicular line below the costal margin with a 0 degree scope and a 12 mm nonbladed trocar.  I insufflated the abdomen and switched an angled scope.  There was no trauma from entry.  I placed the remainder my trocars and a subxiphoid Gage liver retractor.  I opened the  peritoneum along the angle of Hiss.  I then opened the gastrocolic ligament.  I freed up the retrogastric adhesions.  I made a window in the mesocolon below the pancreas to the left of midline.  Identified ligament of Treitz.  I brought small bowel through the mesocolic window into the lesser sac.  I measure down about 50 cm on the jejunum.  I divided the jejunum with a blue load Endo LAVELLE.  I then divided the small bowel mesentery taking care not to devascularize either end of bowel.  I marked my Chucho limb.  I measured 100 cm and lined it up with the biliary limb.  I made a stay suture of 2-0 Vicryl.  I made enterotomies opposite each other.  I fired a 45 mm white load Endo LAVELLE crating and enteroenterostomy.  I closed the common enterotomy with a 2-0 Vicryl.  I closed the mesenteric defect with a running locked 2 Ethibond.  I reduced the small bowel through the mesocolic window taking care not to disoriented.  I made a window along the lesser curvature of the stomach 4 cm from the angle of Hiss.  Anesthesia could not pass the Adlyxine tube.  It was clear was getting hung up at the hiatus.  At this point I determined I had to repair the hiatal hernia in order to appropriately sized the pouch.  I opened the peritoneum along the avascular plane atop the caudate.  There was an accessory hepatic artery which I clipped twice proximally and distally and transected with the harmonic scalpel.  I incised the peritoneum along the right trey.  I carried the dissection up over to the left-hand side.  I then freed up the esophagus from the crura.  Identified posterior vagus and kept it with the esophagus.  I Swept sweeping the esophagus and posterior vagus off of the mediastinal structures till I saw aorta.  I continued dissection anteriorly and then up over the left-hand side.  I then made a window where the crura met posteriorly and brought a Penrose through it.  I used a Penrose for traction.  I circumferentially mobilized the  crura.  I then did a mediastinal dissection getting the esophagus completely off the aorta posteriorly the pleura bilaterally and the pericardium anteriorly.I was careful not to injure either vagus nerve.  Once I did this I had several centimeters of intra-abdominal esophagus.  I then had anesthesia pass a 34 Tea tube and I used a 45 mm blue load Endo LAVELLE to create a horizontal staple line in stomach.  I then used 2 firings of the blue load Endo LAVELLE to create a vertical staple line  the pouch from the bypassed stomach.  I placed clips on the staple line to reduce the risk of postoperative bleeding.  I had them remove the Adlyxine tube.  I ran a back row of 2-0 Vicryl from the antimesenteric border of the Chucho limb to the posterior aspect of the pouch.  I made a gastrotomy and enterotomy.  I fired a blue load Endo LAVELLE for distance 2 and half centimeters.  I closed the common enterotomy with a 2-0 Vicryl starting either end meeting in the middle.  I had anesthesia pass an 18 Afghan orogastric tube through the anastomosis.  I then oversewed the entire anastomosis with a 2-0 Vicryl.  This created a 2 layer closure.  I had anesthesia flatten the patient.  I put a bowel clamp on the Chucho limb distal to the orogastric tube.  I submerged the anastomosis under saline.  We performed an air leak test.  There was no air leak.  I had them remove the air and then the tube.  I took off the clamp.  There was no trauma from the clamp.  I sucked out saline.  I had them put the patient back in reverse Trendelenburg and I replaced my Penrose.  I repaired the crura posterior to the esophagus with 3 interrupted 0 Ethibond sutures.  I removed my Penrose.  I put fibrin sealant on the gastrojejunostomy.  I then closed the Petersons defect through the mesocolic window with a running locked 2-0 Ethibond.  I then placed multiple interrupted 2-0 Ethibond sutures from the small bowel to the mesocolon.  I then removed my subxiphoid  Gage liver retractor.  I closed the 2 inferior fascial defect with 0 Vicryl using a fascial closure device.  I performed bilateral rectus tap block with dilute Marcaine.  I removed the trocars under direct vision.  There was no bleeding.  I irrigated subcutaneous tissue.  I closed the skin with 4-0 undyed Monocryl.  I placed Dermabond.  The patient's anesthetic was reversed, she was extubated and brought to the recovery in stable condition.  All counts were correct.  She tolerated the procedure well.  Complications:  None; patient tolerated the procedure well.    Disposition: PACU - hemodynamically stable.  Condition: stable             Cameron Everett  Phone Number: [phone]

## 2023-12-13 NOTE — CONSULTS
Reason For Consult  HTN, asthma    History Of Present Illness  Jennifer Lane is a 23 y.o. female presenting  for bariatric surgery. Denies SOB, nausea, epigastric pain tolerable  Past Medical History  She has a past medical history of Acid reflux and Asthma.    Surgical History  She has a past surgical history that includes Horner tooth extraction; Appendectomy; and Tonsilectomy, adenoidectomy, bilateral myringotomy and tubes (N/A).     Social History  She reports that she has never smoked. She has never been exposed to tobacco smoke. She has never used smokeless tobacco. She reports that she does not currently use alcohol. She reports that she does not use drugs.    Family History  Family History   Problem Relation Name Age of Onset    No Known Problems Father          Allergies  Patient has no allergy information on record.    Review of Systems    Review of Systems   Constitutional:  Negative for appetite change, chills, fatigue and fever.   HENT:  Negative for ear pain, rhinorrhea, sinus pain and sore throat.    Eyes:  Negative for visual disturbance.   Respiratory:  Negative for cough and shortness of breath.    Cardiovascular:  Negative for chest pain and palpitations.   Gastrointestinal:  Negative for abdominal pain, constipation, diarrhea, nausea and vomiting.   Genitourinary:  Negative for difficulty urinating, frequency and hematuria.   Musculoskeletal:  Negative for arthralgias and joint swelling.   Skin:  Negative for rash.   Neurological:  Negative for dizziness, weakness and headaches.   Psychiatric/Behavioral:  Negative for sleep disturbance. The patient is not nervous/anxious.          Physical Exam  Physical Exam  Constitutional:       General: She is not in acute distress.     Appearance: She is obese.   HENT:      Mouth/Throat:      Comments: Dentition WNL  Cardiovascular:      Rate and Rhythm: Normal rate and regular rhythm.      Heart sounds: Normal heart sounds.   Pulmonary:      Breath sounds:  Normal breath sounds.   Abdominal:      General: Bowel sounds are normal.      Palpations: Abdomen is soft.      Tenderness: There is no abdominal tenderness.   Musculoskeletal:         General: Normal range of motion.      Cervical back: Neck supple. No tenderness.      Right lower leg: No edema.      Left lower leg: No edema.   Lymphadenopathy:      Cervical: No cervical adenopathy.   Skin:     General: Skin is warm.      Findings: No erythema.   Neurological:      General: No focal deficit present.      Mental Status: She is alert and oriented to person, place, and time.      Gait: Gait is intact.   Psychiatric:         Mood and Affect: Mood normal.         Behavior: Behavior normal.              Assessment and plan  HTN - will monitor BP  Asthma- stable  GERD- continue PPI

## 2023-12-13 NOTE — ANESTHESIA PREPROCEDURE EVALUATION
Patient: Jennifer Lane    Procedure Information       Date/Time: 12/13/23 1115    Procedure: Gastric Bypass Laparoscopic **PAT ON ADMIT**    Location: [facility] OR  / [facility] OR    Surgeons: Cameron Everett MD            Relevant Problems   Anesthesia (within normal limits)      Cardiovascular (within normal limits)      Endocrine   (+) Obesity, morbid (CMS/HCC)      GI (within normal limits)      /Renal (within normal limits)      Neuro/Psych (within normal limits)      Pulmonary   (+) Asthma      GI/Hepatic (within normal limits)      Hematology (within normal limits)      Musculoskeletal (within normal limits)      Eyes, Ears, Nose, and Throat (within normal limits)      Infectious Disease (within normal limits)       Clinical information reviewed:   Tobacco  Allergies  Meds   Med Hx  Surg Hx  OB Status  Fam Hx  Soc   Hx    Not on File  Prior to Admission medications    Medication Sig Start Date End Date Taking? Authorizing Provider   pantoprazole (ProtoNix) 20 mg EC tablet Take 1 tablet (20 mg) by mouth once every 24 hours. 2/24/22  Yes Historical Provider, MD   albuterol 90 mcg/actuation inhaler Inhale 2 puffs every 4 hours if needed. 8/10/16   Historical Provider, MD   guanFACINE (Intuniv) 2 mg 24 hr tablet Take 1 tablet (2 mg) by mouth once daily in the morning. Take before meals. 11/24/23 12/12/23  Historical Provider, MD   metFORMIN (Glucophage) 500 mg tablet Take 1 tablet (500 mg) by mouth 2 times a day with meals.  12/12/23  Historical Provider, MD       NPO Detail:  NPO/Void Status  Carbonhydrate Drink Given Prior to Surgery? : N  Date of Last Liquid: 12/12/23  Time of Last Liquid: 2200  Date of Last Solid: 12/12/23  Time of Last Solid: 2200  Last Intake Type: Clear fluids  Time of Last Void: 0955         Physical Exam    Airway  Mallampati: II  TM distance: >3 FB  Neck ROM: full     Cardiovascular - normal exam  Rhythm: regular     Dental - normal exam     Pulmonary - normal exam     Abdominal    (+) obese             Anesthesia Plan    ASA 3     general     The patient is not a current smoker.  Patient was previously instructed to abstain from smoking on day of procedure.  Patient did not smoke on day of procedure.  Education provided regarding risk of obstructive sleep apnea.  intravenous induction   Anesthetic plan and risks discussed with patient.    Plan discussed with CRNA and CAA.

## 2023-12-13 NOTE — ANESTHESIA PROCEDURE NOTES
Airway  Date/Time: 12/13/2023 12:02 PM  Urgency: elective    Airway not difficult    Staffing  Performed: ALAN   Authorized by: Gonzalo Ricks MD    Performed by: EDMAR Tran  Patient location during procedure: OR    Indications and Patient Condition  Indications for airway management: anesthesia  Spontaneous ventilation: present  Sedation level: deep  Preoxygenated: yes  Patient position: sniffing  Mask difficulty assessment: 1 - vent by mask  Planned trial extubation    Final Airway Details  Final airway type: endotracheal airway      Successful airway: ETT  Cuffed: yes   Successful intubation technique: video laryngoscopy  Facilitating devices/methods: intubating stylet  Endotracheal tube insertion site: oral  Blade size: #3  ETT size (mm): 7.5  Cormack-Lehane Classification: grade I - full view of glottis  Placement verified by: capnometry   Measured from: lips  ETT to lips (cm): 21  Number of attempts at approach: 1    Additional Comments  Successful mask ventilation and intubation by medical student, Benita Velasquez.

## 2023-12-13 NOTE — PERIOPERATIVE NURSING NOTE
Patient received to [facility] [place] #5 from OR. Anesthesia at bedside. Report received. Initial assessment complete. VSS on Cardiac Monitor. Continue to monitor.

## 2023-12-14 ENCOUNTER — PHARMACY VISIT (OUTPATIENT)
Dept: PHARMACY | Facility: CLINIC | Age: 23
End: 2023-12-14
Payer: COMMERCIAL

## 2023-12-14 ENCOUNTER — APPOINTMENT (OUTPATIENT)
Dept: RADIOLOGY | Facility: HOSPITAL | Age: 23
DRG: 621 | End: 2023-12-14
Payer: COMMERCIAL

## 2023-12-14 VITALS
OXYGEN SATURATION: 97 % | BODY MASS INDEX: 60.44 KG/M2 | DIASTOLIC BLOOD PRESSURE: 84 MMHG | TEMPERATURE: 97.9 F | SYSTOLIC BLOOD PRESSURE: 134 MMHG | HEART RATE: 103 BPM | WEIGHT: 293 LBS | RESPIRATION RATE: 17 BRPM

## 2023-12-14 PROCEDURE — 2500000005 HC RX 250 GENERAL PHARMACY W/O HCPCS: Performed by: SURGERY

## 2023-12-14 PROCEDURE — 2500000001 HC RX 250 WO HCPCS SELF ADMINISTERED DRUGS (ALT 637 FOR MEDICARE OP): Performed by: SURGERY

## 2023-12-14 PROCEDURE — RXMED WILLOW AMBULATORY MEDICATION CHARGE

## 2023-12-14 PROCEDURE — 96372 THER/PROPH/DIAG INJ SC/IM: CPT | Performed by: SURGERY

## 2023-12-14 PROCEDURE — 2550000001 HC RX 255 CONTRASTS: Performed by: SURGERY

## 2023-12-14 PROCEDURE — 74240 X-RAY XM UPR GI TRC 1CNTRST: CPT

## 2023-12-14 PROCEDURE — 2500000004 HC RX 250 GENERAL PHARMACY W/ HCPCS (ALT 636 FOR OP/ED): Performed by: SURGERY

## 2023-12-14 PROCEDURE — C9113 INJ PANTOPRAZOLE SODIUM, VIA: HCPCS | Performed by: SURGERY

## 2023-12-14 PROCEDURE — 2500000005 HC RX 250 GENERAL PHARMACY W/O HCPCS: Performed by: INTERNAL MEDICINE

## 2023-12-14 PROCEDURE — 2500000001 HC RX 250 WO HCPCS SELF ADMINISTERED DRUGS (ALT 637 FOR MEDICARE OP)

## 2023-12-14 RX ORDER — OMEPRAZOLE 40 MG/1
40 CAPSULE, DELAYED RELEASE ORAL DAILY
Qty: 30 CAPSULE | Refills: 2 | Status: SHIPPED | OUTPATIENT
Start: 2023-12-14 | End: 2024-03-13

## 2023-12-14 RX ORDER — ENOXAPARIN SODIUM 100 MG/ML
40 INJECTION SUBCUTANEOUS DAILY
Qty: 14 EACH | Refills: 0 | Status: SHIPPED | OUTPATIENT
Start: 2023-12-15 | End: 2023-12-29

## 2023-12-14 RX ORDER — OXYCODONE HCL 5 MG/5 ML
5 SOLUTION, ORAL ORAL EVERY 6 HOURS PRN
Qty: 90 ML | Refills: 0 | Status: SHIPPED | OUTPATIENT
Start: 2023-12-14 | End: 2023-12-19

## 2023-12-14 RX ORDER — ACETAMINOPHEN 160 MG/5ML
650 SOLUTION ORAL EVERY 4 HOURS PRN
Start: 2023-12-14

## 2023-12-14 RX ORDER — ACETAMINOPHEN 160 MG/5ML
650 SOLUTION ORAL EVERY 4 HOURS PRN
Status: DISCONTINUED | OUTPATIENT
Start: 2023-12-14 | End: 2023-12-14 | Stop reason: HOSPADM

## 2023-12-14 RX ORDER — OXYCODONE HCL 5 MG/5 ML
5 SOLUTION, ORAL ORAL EVERY 4 HOURS PRN
Status: DISCONTINUED | OUTPATIENT
Start: 2023-12-14 | End: 2023-12-14 | Stop reason: HOSPADM

## 2023-12-14 RX ORDER — OXYCODONE HCL 5 MG/5 ML
10 SOLUTION, ORAL ORAL EVERY 4 HOURS PRN
Status: DISCONTINUED | OUTPATIENT
Start: 2023-12-14 | End: 2023-12-14 | Stop reason: HOSPADM

## 2023-12-14 RX ADMIN — OXYCODONE HYDROCHLORIDE 5 MG: 5 SOLUTION ORAL at 14:13

## 2023-12-14 RX ADMIN — IOHEXOL 22.5 ML: 350 INJECTION, SOLUTION INTRAVENOUS at 09:30

## 2023-12-14 RX ADMIN — BUPRENORPHINE HYDROCHLORIDE 0.1 MG: 0.3 INJECTION INTRAMUSCULAR; INTRAVENOUS at 02:00

## 2023-12-14 RX ADMIN — ENOXAPARIN SODIUM 40 MG: 40 INJECTION SUBCUTANEOUS at 05:07

## 2023-12-14 RX ADMIN — METOPROLOL TARTRATE 5 MG: 5 INJECTION INTRAVENOUS at 05:07

## 2023-12-14 RX ADMIN — ACETAMINOPHEN 650 MG: 160 SOLUTION ORAL at 14:13

## 2023-12-14 RX ADMIN — ONDANSETRON 4 MG: 2 INJECTION INTRAMUSCULAR; INTRAVENOUS at 05:08

## 2023-12-14 RX ADMIN — Medication 2 L/MIN: at 08:00

## 2023-12-14 RX ADMIN — ACETAMINOPHEN 1000 MG: 10 INJECTION INTRAVENOUS at 05:07

## 2023-12-14 RX ADMIN — OXYCODONE HYDROCHLORIDE 10 MG: 5 SOLUTION ORAL at 10:05

## 2023-12-14 RX ADMIN — METOPROLOL TARTRATE 5 MG: 5 INJECTION INTRAVENOUS at 00:09

## 2023-12-14 RX ADMIN — PANTOPRAZOLE SODIUM 40 MG: 40 INJECTION, POWDER, FOR SOLUTION INTRAVENOUS at 08:15

## 2023-12-14 RX ADMIN — METOCLOPRAMIDE 10 MG: 5 INJECTION, SOLUTION INTRAMUSCULAR; INTRAVENOUS at 10:12

## 2023-12-14 RX ADMIN — ACETAMINOPHEN 1000 MG: 10 INJECTION INTRAVENOUS at 00:08

## 2023-12-14 RX ADMIN — Medication 1 TABLET: at 11:16

## 2023-12-14 ASSESSMENT — PAIN - FUNCTIONAL ASSESSMENT
PAIN_FUNCTIONAL_ASSESSMENT: 0-10
PAIN_FUNCTIONAL_ASSESSMENT: WONG-BAKER FACES
PAIN_FUNCTIONAL_ASSESSMENT: 0-10
PAIN_FUNCTIONAL_ASSESSMENT: 0-10

## 2023-12-14 ASSESSMENT — COGNITIVE AND FUNCTIONAL STATUS - GENERAL
DAILY ACTIVITIY SCORE: 24
MOBILITY SCORE: 24

## 2023-12-14 ASSESSMENT — PAIN SCALES - GENERAL
PAINLEVEL_OUTOF10: 8
PAINLEVEL_OUTOF10: 3
PAINLEVEL_OUTOF10: 0 - NO PAIN
PAINLEVEL_OUTOF10: 5 - MODERATE PAIN
PAINLEVEL_OUTOF10: 7
PAINLEVEL_OUTOF10: 5 - MODERATE PAIN
PAINLEVEL_OUTOF10: 6

## 2023-12-14 ASSESSMENT — PAIN DESCRIPTION - LOCATION: LOCATION: ABDOMEN

## 2023-12-14 NOTE — NURSING NOTE
Pt is up ambulating in negro. No complaints. All surgical sites to abd remains C/D/I. No change noted from initial shift assessment. Call light in reach.

## 2023-12-14 NOTE — CARE PLAN
The patient's goals for the shift include      The clinical goals for the shift include pain management,progress diet    Over the shift, the patient did not make progress toward the following goals. Barriers to progression include pain to abdomen. Recommendations to address these barriers include up walking in halls.

## 2023-12-14 NOTE — PROGRESS NOTES
Nutrition Progress Note    Consult for post-op bariatric diet education. Dr. Everett and team will provide all necessary diet edu. RDN deferring assessment at this time.

## 2023-12-14 NOTE — PROGRESS NOTES
Dietary Rounds and Nutrition Discharge Education:      Saw Jennifer in the hospital this morning around 11:45 am. She was coming back from her walk upon arrival. She tells me that she consumed around 2-3 ounces of water. She is feeling nauseous, but reports that she has not vomited. I had her take 1/2 ounce of broth in front of me. She reports fluids are staying down with no issues.     Thin liquid diet education was provided and packet was given to patient. Reviewed the importance of consuming at least 50 oz of fluid per day once discharged. Patient was instructed to start protein shake tomorrow evening once 40-50 oz of fluid was consumed. Informed patient to gradually work way up to 50g protein per day. Also reviewed things to avoid at this time including carbonation, alcohol, sugar, jello, straws, and gum. Patient was instructed to follow liquid diet for a full 2 weeks, and to not progress diet until instructed to do so. Patient shows fair to good understanding of education provided.       Jennifer Chan, MS, RD, LD

## 2023-12-14 NOTE — DISCHARGE INSTRUCTIONS
No bending, pushing, pulling, lifting more than 10lbs, twisting for 6 weeks. Walk at least every hour during the day.    Face away from the water when showering, pat incision dry. Do not submerge incision until okayed by office. Do not rub any lotions or moisturizers on incisions.    Take sips of noncarbonated, low calorie, low to no sugar liquids every 3 to 5 minutes for a goal of 50- 60 oz per day (6-8 cups). Add 1 protien shake per day beginning in the evening 1-2 days after discharge. Increase to 2 protien shakes per day in the second week for a goal of 50-60 grams of protien per day. Make it the last liquid of the day. NO straws, gum, jello, milkshakes, or icecream.    If no CPAP at home, sleep upright and do not take any narcotics for several hours prior to napping or sleeping    Crush all medications and open all capsules for 6 weeks. Mix the beads for a capsule in milk or a protein shake, not water, to prevent clumping. Lovenox, a blood thinner, was sent to the pharmacy. You will inject this into your outer thigh once daily for 14 days. Rotate injection sites. Do not drive while taking pain medication.     Follow instructions: Sips of noncarbonated low to no calorie, low to no sugar liquids every three minutes for a goal of 6-8 glasses of fluid per day. Walk for 2-3 minutes every hour. Use the incentive spirometer 10 times per hour while awake. Add one protein shake per day start Friday or Saturday with a goal of 2 shakes per day starting the second week.       Home-going Instructions    #1 Fluids             drink 50 - 65 oz of non-caffeinated fluids daily  #2 Walk               walk 3 - 5 minutes every hour during the day  #3 Spirometer   use the incentive spirometer 10 times and hour during the day  #4 Protein          after drinking 50oz of fluids begin drinking protein    Wound Care  Do not submerge incisions in pool, bath, or hot tub  Do not peel off Dermabond -it will gradually loosen and fall  off  You may shower and pat incisions dry  Do not apply any lotions, creams, or ointments to your incisions  Activity  Do not push, pull, or lift.  Avoid excessive bending and twisting at the waist.  You may walk stairs.  You may sleep in any position that feels comfortable.  You must get up and walk every hour during the day.  If you plan to take a nap during the day, set an alarm for one hour so you will get up and walk around.  Potential Complications   Wound Infection - redness, increased warmth, pain, thick drainage, fever  Blood Clot/Pulmonary Embolism - calf pain or swelling, chest pain, shortness of breath, racing heart, fast breathing  Leak - abdominal pain radiating down the left side (after eating/drinking), fever, fast breathing, or rapid heart rate.   CALL 9-1-1 WITH ANY CHEST PAIN OR SHORTNESS OF BREATH, otherwise call the office with any concerns. [phone]  Medications  All medications need to be crushable, chewable, in liquid form or open capsules.   CANNOT crush extended release meds.  You must begin taking your stomach acid reducing medication the morning after you are discharged from the hospital.  Start your chewable or liquid multivitamin tomorrow.  Diet  Full liquid.  NO CARBONATION.  Must be thin enough to go up a small stirrer-type straw. BUT DO NOT USE STRAWS.  Remember first goal is to drink at least 50 oz. fluid every day.  Increase your protein as tolerated for a goal of 50 grams of protein daily.  Refer to your manual for explicit instructions.  Do not drink any fluid that has more than 25 grams of carbohydrate per 8 oz.  This will prevent dumping.  Follow-up     2 weeks

## 2023-12-14 NOTE — DISCHARGE SUMMARY
Discharge Diagnosis  Obesity, morbid (CMS/HCC)    Issues Requiring Follow-Up  Follow up in the office in 2 weeks.     Test Results Pending At Discharge  Pending Labs       No current pending labs.            Hospital Course  Jennifer had Laparoscopic Chucho-en-Y Gastric Bypass with hiatal hernia on 12/13. She had an UGI on 12/14 and was started on a diet. She was discharged to home.     Pertinent Physical Exam At Time of Discharge  Physical Exam  Abdominal:      General: There is no distension.      Palpations: Abdomen is soft.      Tenderness: There is no abdominal tenderness. There is no guarding.      Comments: Incisions intact         Home Medications     Medication List      START taking these medications     acetaminophen 325 mg/10.15 mL oral liquid; Commonly known as: Tylenol;   Take 20.3 mL (650 mg) by mouth every 4 hours if needed for pain or fever   (temp greater than 38.0 C). Take over the counter liquid tylenol as   needed. Dilute with equal parts water to prevent dumping syndrome.   enoxaparin 40 mg/0.4 mL syringe; Commonly known as: Lovenox; Inject 0.4   mL (40 mg) under the skin once daily for 14 days. Inject into your outer   thigh. Rotate injection sites. Do not start before December 15, 2023.;   Start taking on: December 15, 2023   omeprazole 40 mg DR capsule; Commonly known as: PriLOSEC; Take 1 capsule   (40 mg) by mouth once daily. Open capsule. Do not crush, chew, or dissolve   beads.   oxyCODONE 5 mg/5 mL solution; Commonly known as: Roxicodone; Take 5 mL   (5 mg) by mouth every 6 hours if needed for severe pain (7 - 10) for up to   3 days. Dilute with equal parts water to prevent dumping syndrome.     CONTINUE taking these medications     albuterol 90 mcg/actuation inhaler     STOP taking these medications     pantoprazole 20 mg EC tablet; Commonly known as: ProtoNix       Outpatient Follow-Up  Future Appointments   Date Time Provider Department Center   12/22/2023  9:00 AM Claudette Payton  MELVINACNP [id number] [facility]   1/4/2024  1:00 PM TITUS Zaldivar [id number] [facility]   1/19/2024 12:30 PM MD ELISABET [id number] [facility]       TITUS Zaldivar

## 2023-12-14 NOTE — PROGRESS NOTES
Jennifer Lane is a 23 y.o. female on day 1 of admission presenting with Obesity, morbid (CMS/HCC).    Subjective   Jennifer is laying in the bed. She denies chest pain or SOB. She tells me that her incisional pain is tolerable after her pain medication. She is feeling nauseated after taking the pain medication. She tolerated her popsicle.        Objective     Physical Exam  Abdominal:      General: There is no distension.      Palpations: Abdomen is soft.      Tenderness: There is no abdominal tenderness. There is no guarding.      Comments: Incisions intact.          Last Recorded Vitals  Blood pressure 136/78, pulse 76, temperature 36.6 °C (97.9 °F), temperature source Oral, resp. rate 18, weight 139 kg (306 lb 14.1 oz), last menstrual period 11/29/2023, SpO2 97 %.  Intake/Output last 3 Shifts:  I/O last 3 completed shifts:  In: 2550 (18.3 mL/kg) [I.V.:2350 (16.9 mL/kg); IV Piggyback:200]  Out: 1100 (7.9 mL/kg) [Urine:1100 (0.2 mL/kg/hr)]  Weight: 139.2 kg     Relevant Results  UGI WNL    Assessment/Plan   Principal Problem:    Obesity, morbid (CMS/HCC)  Active Problems:    Asthma    Morbid obesity with BMI of 40.0-44.9, adult (CMS/HCC)    Hiatal hernia    Gastroesophageal reflux disease without esophagitis    Jennifer and I reviewed the rate at which to drink her fluids and her fluid goal for the day. We discussed only taking a 30min nap in the bed, then to spend the rest of the day up in the chair. I encouraged her to continue ambulating and use her IS every hour.          I spent 20 minutes in the professional and overall care of this patient.      Claudette Payton, APRN-CNP

## 2023-12-14 NOTE — PROGRESS NOTES
Met with patient at bedside to discuss discharge planning, spouse present at time of assessment.  Patient is A&OX3 from home with her spouse and their roommates, they live in the basement of a one story home.  There are 3 steps to enter the home and 10 to the lower level.  Prior to admission patient was independent in all ADL's and IADL's, uses no DME, and still drives.  Patient does not have PCP.  Patient feels they have all the help they need at home and are currently denying the need for additional services and/or resources on discharge. Patient states her spouse or her parents will provide transportation home on discharge.      Home no needs  DC plan secure

## 2023-12-15 ENCOUNTER — DOCUMENTATION (OUTPATIENT)
Dept: SURGERY | Facility: CLINIC | Age: 23
End: 2023-12-15
Payer: COMMERCIAL

## 2023-12-15 NOTE — PROGRESS NOTES
Bariatric Surg Post-op Call:          Date: 12/15/2023.          Called by: Fito.          How many ounces of fluid are you drinking? 20oz so far.          How many grams of protein are you drinking? is planning to try Saturday.          Still taking pain medication? only tylenol.          Are you taking something to prevent blood clots? lovenox daily 2 weeks.          Are you walking every hour? yes.

## 2023-12-22 ENCOUNTER — NUTRITION (OUTPATIENT)
Dept: SURGERY | Facility: CLINIC | Age: 23
End: 2023-12-22

## 2023-12-22 ENCOUNTER — OFFICE VISIT (OUTPATIENT)
Dept: SURGERY | Facility: CLINIC | Age: 23
End: 2023-12-22
Payer: COMMERCIAL

## 2023-12-22 VITALS
BODY MASS INDEX: 54.77 KG/M2 | HEIGHT: 60 IN | SYSTOLIC BLOOD PRESSURE: 132 MMHG | WEIGHT: 279 LBS | HEART RATE: 105 BPM | DIASTOLIC BLOOD PRESSURE: 85 MMHG

## 2023-12-22 DIAGNOSIS — Z09 SURGERY FOLLOW-UP EXAMINATION: Primary | ICD-10-CM

## 2023-12-22 PROCEDURE — 3008F BODY MASS INDEX DOCD: CPT

## 2023-12-22 PROCEDURE — 1036F TOBACCO NON-USER: CPT

## 2023-12-22 PROCEDURE — 99024 POSTOP FOLLOW-UP VISIT: CPT

## 2023-12-22 RX ORDER — BISMUTH SUBSALICYLATE 262 MG
1 TABLET,CHEWABLE ORAL DAILY
COMMUNITY

## 2023-12-22 RX ORDER — GUANFACINE 1 MG/1
1 TABLET ORAL
COMMUNITY

## 2023-12-22 RX ORDER — URSODIOL 300 MG/1
300 CAPSULE ORAL 2 TIMES DAILY
Qty: 60 CAPSULE | Refills: 6 | Status: SHIPPED | OUTPATIENT
Start: 2023-12-22 | End: 2024-07-19

## 2023-12-22 ASSESSMENT — PAIN SCALES - GENERAL: PAINLEVEL: 0-NO PAIN

## 2023-12-22 NOTE — PROGRESS NOTES
"Subjective   Patient ID: Jennifer Lane is a 23 y.o. female who presents for Post-op (2 wk post op).  HPI  Jennifer is here for her 2 week follow up. She denies reflux. She is drinking 55-65oz of fluid per day. She is getting 30g of protein per day. She tells me that she is having a hard time getting her protein because she doesn't like the shakes. She is walking every hour. She is currently taking Celebrate 18 MVI, lovenox, and omeprazole. She is unsure how many doses she has left of lovenox.       Objective   Physical Exam  Abdominal:      General: There is no distension.      Palpations: Abdomen is soft.      Tenderness: There is no abdominal tenderness. There is no guarding.      Comments: Incisions closed         Visit Vitals  /85 (BP Location: Left arm, Patient Position: Sitting)   Pulse 105   Ht 1.518 m (4' 11.75\")   Wt 127 kg (279 lb) Comment: START WT:296  IDEAL WT:129 START EXCESS: 167 TOTAL WT LOSS:   17   EWL: 10   % HT: 59.75 IN today wt: 279 lb   LMP 11/29/2023   BMI 54.95 kg/m²   OB Status Having periods   Smoking Status Never   BSA 2.31 m²       Assessment/Plan   Problem List Items Addressed This Visit             ICD-10-CM    Surgery follow-up examination - Primary Z09    Relevant Medications    ursodiol (Actigall) 300 mg capsule     Start ursodiol twice daily. Mix with sugar free jam or jelly a few days after starting pureed diet. Continue activity restrictions. Continue to crush pills. May face shower. Advance diet per protocol. Continue MVI with Fe, PPI. Reviewed the \"rules\" for long term weight loss success.  Continue to increase walking for exercise.         Claudette Payton, JONATHAN-CNP 12/22/23 9:30 AM   "

## 2023-12-22 NOTE — PROGRESS NOTES
2 week Post-op Appointment - Dietary Follow Up      Current Weight: 279 lbs   Current BMI:  54.95  EWL: 10%     Reviewed pureed diet progression. Patient was instructed to start purees on Thurs 12/28. I reviewed how to blend foods appropriately to one consistency using a . Pureed food examples and recipes were provided in packet, reviewed foods to avoid at this time. Informed patient to measure out portion sizes and track volume size. Informed patient to aim for at least 60g of protein per day and to add protein shake as needed. Encouraged continuing to prioritize fluids and ensure getting in at least 50-60 oz fluid daily. Reviewed the importance of slowly eating and stop when starting to feel full. Patient was instructed to follow pureed diet for a full 2 weeks, and to not progress diet until instructed to do so. Pureed education packet was given, patient shows good understanding.     Jennifer Chan, MS, RD, LD

## 2024-01-03 NOTE — PROGRESS NOTES
"4 WK FUV RYGB/ HHR  START WT:  296  IDEAL WT: 129    START EXCESS: 167 TOTAL WT LOSS: 26    EWL: 16 % HT: 59.75 IN    HPI:     General Comments:          Do you have any difficulties with:  swallowing?  No, nausea?  No, vomiting?  No, pain?  No, heartburn?  No, discomfort?  No, intolerances?  No. Fluid and Protein Intake:  Reviewed fluid and protein log:  Yes,   Fluid intake (ounces): 64 Sources:,   Protein intake (grams):    50 Sources:.   Daily Diet Recall: ----. Volume of food at a time: pureed. Chewable MVI:  Taking as directed?  Yes. PPI/omeprazole:  Taking as directed/prescribed?  Yes.   Walking:  Every hour?  Yes.   Ursodiol:  Taking as directed/prescribed?  Yes.      -Bariatric Follow-up:          Receive IV fluids  No. Seen in ER with admission No. Seen ER without admission No. Hospital readmission No.          Medical History:          Objective:        Physical Examination:       Incisions healed.         Assessment:        Assessment:    1. Surgery follow-up examination - Z09      Plan:        1. Others    Notes: Advance diet per protocol  Continue MVI with Fe, PPI  Reviewed the \"rules\" for long term weight loss success.  Continue to increase walking for exercise..              Preventive Medicine:      Counseling:  Medication education:  Education:  All new and/or current medications discussed and reviewed including side effects with patient/caregiver, Understanding:  Caregiver/Patient expressed understanding., Adherence:  Barriers to adherence identified and discussed if present. BMI Care goal follow up plan:  Above normal BMI management provided  Dietary management education, guidance, and counseling.           Follow Up: 2 Weeks                   Billing Information:         Visit Code:        Procedure Codes:        "

## 2024-01-04 ENCOUNTER — CLINICAL SUPPORT (OUTPATIENT)
Dept: SURGERY | Facility: CLINIC | Age: 24
End: 2024-01-04
Payer: COMMERCIAL

## 2024-01-04 NOTE — PROGRESS NOTES
4 week Post-op Appointment - Dietary Follow Up     Current Weight: 279 lbs   Current BMI: 54.95    Have you received IV fluids since surgery? No   Have you been to the ER since surgery? (If so, where and what date) No   Hospital readmission since surgery? No   Have you been diagnosed with COVID since surgery? No   Do you have any difficulty swallowing? No   Nausea? A few times got nauseous, but reports not from foods   Vomiting? Jennifer tells me that she vomited once. This was not from food. She reports she was walking and then sat down, had some water and then puked it up. This was about a week ago, she reports no other incidences since.   Pain? No   Discomfort? No  Intolerances? No    Heartburn? No   Are you taking your PPI? Are you opening the capsule? Yes and yes   Do you have a gallbladder? Yes   Are you taking ursodiol as directed? Is it crushed? Yes and takes it with yogurt daily   Are you on lovenox? Or were you started on a blood thinner for diagnosed DVT? Jennifer states that she was on Lovenox for 2 weeks but no longer is taking them   Are you walking every hour? Walking frequently per pt, a few times per day     Fluid Intake (ounces and sources): 50 ounces of fluid per day   Protein Intake (grams and sources): 40-50 grams of protein daily, Jennifer states that she is trying to increase. Hoping she can find more options when she is on soft foods    Volume of pureed tolerated: ~1/2 cup  Daily Food Recall:    Breakfast - Triple zero 20 grams Greek yogurt   Lunch - 1/2 protein shake that provides 15 gram of protein   Dinner - 1/2 cup pureed Cottage cheese or chili blended   Are you taking your chewable MVI as directed? Yes, Celebrate 45     Reviewed soft food diet progression. Patient was instructed to start soft food stage 1 on Thursday 1/11. I reviewed which foods patient can start to incorporate and which foods to avoid. Instructed to start soft food stage 2 on Thurs 1/18. Informed patient to measure out  portion sizes and track volume size. Informed patient to aim for at least 60g of protein per day and to add protein shake as needed. Encouraged continuing to prioritize fluids and ensure getting in at least 50-60 oz fluid daily. Reviewed the importance of slowly eating and stop when starting to feel full. Patient was instructed to follow the soft diet for a full 2 weeks, and to not progress diet until instructed to do so. Soft food education packet was given, patient shows good understanding.

## 2024-01-18 ENCOUNTER — NUTRITION (OUTPATIENT)
Dept: SURGERY | Facility: CLINIC | Age: 24
End: 2024-01-18
Payer: COMMERCIAL

## 2024-01-18 NOTE — PROGRESS NOTES
"Subjective   Patient ID: Jennifer Lane is a 24 y.o. female who presents for No chief complaint on file..  HPI  6 WK FUV RYGB  START WT:       IDEAL WT:         START EXCESS:           TOTAL WT LOSS:           EWL:              %   Chief Complaints:         1. PBS:6 wk f/u.          HPI:     General Comments:          Do you have any difficulties with:  swallowing?  No, nausea?  No, vomiting?  No, pain?  No, heartburn?  No, discomfort?  No, intolerances?  No. Fluid and Protein Intake:  Reviewed fluid and protein log:  Yes,   Fluid intake (ounces):    50    Sources:,   Protein intake (grams):  60    Sources:.   Daily Diet Recall: ----. Volume of food at a time: soft. Chewable MVI:  Taking as directed?  Yes. PPI/omeprazole:  Taking as directed/prescribed?  Yes. Walking:  Every hour?  Yes. Ursodiol:  Taking as directed/prescribed?  Yes.      -Bariatric Follow-up:          Receive IV fluids  No. Seen in ER with admission No. Seen ER without admission No. Hospital readmission No.           Medical History:          Objective:       Assessment:        Assessment:    1. Surgery follow-up examination - Z09   2. H/O bariatric surgery - Z98.84   3. 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 Vitamin D deficiency, unspecified - E55.9   5. B12 deficiency - E53.8   6. Hyperparathyroidism - E21.3      Plan:        1. Others    Notes:  Advance diet per protocol  May swallow whole pills  Reviewed the \"rules\" for long-term weight loss success  Continue PPI, MVI with Fe  Begin micronutrient supplement protocol/handout given  Activity restrictions lifted  Discussed the importance of regular exercise for continued long-term weight loss success  .              Preventive Medicine:      Counseling:  Medication education:  Education:  All new and/or current medications discussed and reviewed including side effects with patient/caregiver, Understanding:  Caregiver/Patient expressed understanding., Adherence:  Barriers to adherence " identified and discussed if present. BMI Care goal follow up plan:  Above normal BMI management provided  Dietary management education, guidance, and counseling.           Follow Up: 6 Weeks      Review of Systems    Objective   Physical Exam    Assessment/Plan            Lima Ryder LPN 01/18/24 2:38 PM

## 2024-01-18 NOTE — PROGRESS NOTES
6 week Post-op Appointment - Dietary Follow Up    Spoke to Jennifer this afternoon over the phone around 3 pm. She reports that she tested positive for COVID. She reports that she has mild symptoms - stuffy nose. Jennifer is averaging 50 ounces daily. I encouraged Jennifer to increase her fluids by an additional 10-20 ounces (60-70 ounces daily), especially when sick. Jennifer reports that she will try, however she states that she can only take small sips at a time, and that it can be hard for her to drink more. However, she reports that she will try the best she can. She currently drinks a 32 ounce water bottle and a Gatorade zero (20 oz) daily. Plain water makes her nauseous first thing in the morning. Flavored water goes down easier. Jennifer reports that she does not feel dehydrated. She reports no headaches or nausea. She reports that her urine is medium yellow in color.   Jennifer is having 3 meals per day. She can tolerate ~1/2 cup at a time. She is not drinking protein shakes. She is averaging ~45-50 grams of protein daily.   Food recall:   Breakfast : Triple Zero 20 gram Greek yogurt   Lunch: 1 Tuna packet   Dinner: 2 egg omelet with cheese   Jennifer reports that she has had no issues with soft foods. She transitions into phase 2 today. Reminded pt to make sure chicken and turkey products are very soft and not dry.     Reviewed 6 week diet progression - starting on 1/25. I reviewed which foods patient can start to incorporate and which foods to avoid. I reviewed timeline with patient over the next 6 weeks on which foods can be reintroduced back into the diet. Informed patient to measure out portion sizes and track volume size. Informed patient to aim for at least 60g of protein per day and continue to prioritize fluids and ensure getting in at least 50-60 oz fluid daily. Reviewed the importance of slowly eating and stop when starting to feel full. 6 week diet education packet was given.     Reviewed Vitamin and Mineral  supplementation to start taking at 6 weeks(1/25) post-op in addition to daily MVI. Jennifer is taking Celebrate 18 MVI, and was instructed to continue taking once a day. Jennifer would like to purchase calcium citrate at [facility] - recommended Equate calcium citrate petites - 3 tablets 2X per day. Informed Jennifer to take calcium 2 hours apart from MVI. Jennifer shows good understanding of education provided.       Jennifer Chan, MS, RD, LD

## 2024-01-19 ENCOUNTER — OFFICE VISIT (OUTPATIENT)
Dept: SURGERY | Facility: CLINIC | Age: 24
End: 2024-01-19
Payer: COMMERCIAL

## 2024-01-19 DIAGNOSIS — Z09 SURGERY FOLLOW-UP EXAMINATION: Primary | ICD-10-CM

## 2024-01-19 PROCEDURE — 3008F BODY MASS INDEX DOCD: CPT | Performed by: SURGERY

## 2024-01-19 PROCEDURE — 99024 POSTOP FOLLOW-UP VISIT: CPT | Performed by: SURGERY

## 2024-01-19 PROCEDURE — 1036F TOBACCO NON-USER: CPT | Performed by: SURGERY

## 2024-01-19 NOTE — H&P
History Of Present Illness  Jennifer Lane is a 24 y.o. woman having telehealth visit for 6 week follow up.  She is taking her ppi.  She was reminded to take it for another 6 weeks.  She can start swallowing pills on 1/24.  She will add vitamins on 1/24.  She was reminded to take the ursodiol for another 5 months.       Past Medical History  Past Medical History:   Diagnosis Date    Acid reflux     Asthma        Surgical History  Past Surgical History:   Procedure Laterality Date    APPENDECTOMY      GASTRIC BYPASS  12/13/2023    ProcedurE Gastric Bypass Laparoscopic General Repair Diaphragmatic Hernia Laparoscopy    TONSILECTOMY, ADENOIDECTOMY, BILATERAL MYRINGOTOMY AND TUBES N/A     WISDOM TOOTH EXTRACTION          Social History  She reports that she has never smoked. She has never been exposed to tobacco smoke. She has never used smokeless tobacco. She reports that she does not currently use alcohol. She reports that she does not use drugs.    Family History  Family History   Problem Relation Name Age of Onset    Other (MORBID OBESITY) Mother      No Known Problems Father          Allergies  Patient has no known allergies.    Last Recorded Vitals  There were no vitals taken for this visit.       Assessment/Plan   Problem List Items Addressed This Visit             ICD-10-CM       Health Encounters    Surgery follow-up examination - Primary Z09       Continue ppi  Continue mvi- add calcium and B12.  Advance diet-meet with dietitian today.  Follow up in 6-7 weeks with labwork.       Cameron Everett MD    
66.5

## 2024-03-08 NOTE — PROGRESS NOTES
Subjective   Patient ID: Jennifer Lane is a 24 y.o. female who presents for No chief complaint on file..  HPI  3 MOS FUV RYGB  START WT: 296  IDEAL WT: 129   START EXCESS:167   TOTAL WT LOSS: 39       EWL: 23   % HT: 59.75  Review of Systems  Bariatric Surgery F/U:          Seen at ER after surgery? No.  Hospital admission? No.  Bariatric reoperation? No.  Bariatric intervention? No.  Was anticoagulation initiated for presumed/confirmed Vein Thrombosis/PE? No.  Was an incisional hernia noted on exam? No.  Sleep Apnea? No.  Still using C-PAP? No.  GERD req. meds? No.  Hyperlipidemia? No.  Still taking Cholesterol med? No.  Hypertension? No.  Still taking HTN med? No.  Number of Anti-hypertensive Medications: 0.  Diabetes? No.  Still taking DM med? No.  Current DM medication type: _____.         CONSTITUTIONAL:          Chills No.  Fatigue No.  Fever No.         CARDIOLOGY:          Negative for dizziness, chest pain, palpitations, shortness of breath.         RESPIRATORY:          Negative for chest congestion, cough, wheezing.         GASTROENTEROLOGY:          Food Intolerance No.  Acid reflux No.  Abdominal pain No.  Black stools No.  Constipation No.  Diarrhea No.  Loss of appetite no.  Nausea No.  Vomiting No.         UROLOGY:          Negative for dysuria, urinary urgency, kidney stones.         PSYCHOLOGY:          Negative for depression, anxiety, high stress level.   Objective   Physical Exam    Assessment/Plan            Lima Ryder LPN 03/08/24 10:36 AM

## 2024-03-11 ENCOUNTER — HOSPITAL ENCOUNTER (EMERGENCY)
Facility: HOSPITAL | Age: 24
Discharge: HOME | End: 2024-03-11
Attending: HOSPITALIST
Payer: COMMERCIAL

## 2024-03-11 VITALS
TEMPERATURE: 98.6 F | OXYGEN SATURATION: 97 % | HEIGHT: 59 IN | SYSTOLIC BLOOD PRESSURE: 139 MMHG | WEIGHT: 257.5 LBS | RESPIRATION RATE: 20 BRPM | DIASTOLIC BLOOD PRESSURE: 78 MMHG | BODY MASS INDEX: 51.91 KG/M2

## 2024-03-11 PROCEDURE — 4500999001 HC ED NO CHARGE

## 2024-03-11 ASSESSMENT — PAIN SCALES - GENERAL: PAINLEVEL_OUTOF10: 7

## 2024-03-11 ASSESSMENT — COLUMBIA-SUICIDE SEVERITY RATING SCALE - C-SSRS
1. IN THE PAST MONTH, HAVE YOU WISHED YOU WERE DEAD OR WISHED YOU COULD GO TO SLEEP AND NOT WAKE UP?: NO
2. HAVE YOU ACTUALLY HAD ANY THOUGHTS OF KILLING YOURSELF?: NO
6. HAVE YOU EVER DONE ANYTHING, STARTED TO DO ANYTHING, OR PREPARED TO DO ANYTHING TO END YOUR LIFE?: NO

## 2024-03-11 ASSESSMENT — PAIN DESCRIPTION - LOCATION: LOCATION: ABDOMEN

## 2024-03-11 ASSESSMENT — PAIN - FUNCTIONAL ASSESSMENT: PAIN_FUNCTIONAL_ASSESSMENT: 0-10

## 2024-03-14 ENCOUNTER — NUTRITION (OUTPATIENT)
Dept: SURGERY | Facility: CLINIC | Age: 24
End: 2024-03-14

## 2024-03-14 ENCOUNTER — LAB (OUTPATIENT)
Dept: LAB | Facility: LAB | Age: 24
End: 2024-03-14
Payer: COMMERCIAL

## 2024-03-14 ENCOUNTER — OFFICE VISIT (OUTPATIENT)
Dept: SURGERY | Facility: CLINIC | Age: 24
End: 2024-03-14
Payer: COMMERCIAL

## 2024-03-14 VITALS
BODY MASS INDEX: 50.45 KG/M2 | HEIGHT: 60 IN | HEART RATE: 94 BPM | DIASTOLIC BLOOD PRESSURE: 86 MMHG | WEIGHT: 257 LBS | SYSTOLIC BLOOD PRESSURE: 134 MMHG

## 2024-03-14 DIAGNOSIS — Z98.84 S/P GASTRIC BYPASS: ICD-10-CM

## 2024-03-14 DIAGNOSIS — E21.3 HYPERPARATHYROIDISM (MULTI): ICD-10-CM

## 2024-03-14 DIAGNOSIS — K90.9 INTESTINAL MALABSORPTION, UNSPECIFIED TYPE (HHS-HCC): Primary | ICD-10-CM

## 2024-03-14 DIAGNOSIS — E53.8 B12 DEFICIENCY: ICD-10-CM

## 2024-03-14 DIAGNOSIS — E55.9 VITAMIN D DEFICIENCY: ICD-10-CM

## 2024-03-14 DIAGNOSIS — K80.20 CALCULUS OF GALLBLADDER WITHOUT CHOLECYSTITIS WITHOUT OBSTRUCTION: ICD-10-CM

## 2024-03-14 PROCEDURE — 3008F BODY MASS INDEX DOCD: CPT | Performed by: SURGERY

## 2024-03-14 PROCEDURE — 1036F TOBACCO NON-USER: CPT | Performed by: SURGERY

## 2024-03-14 PROCEDURE — 99214 OFFICE O/P EST MOD 30 MIN: CPT | Performed by: SURGERY

## 2024-03-14 ASSESSMENT — ENCOUNTER SYMPTOMS
DEPRESSION: 0
LOSS OF SENSATION IN FEET: 0
OCCASIONAL FEELINGS OF UNSTEADINESS: 0

## 2024-03-14 ASSESSMENT — COLUMBIA-SUICIDE SEVERITY RATING SCALE - C-SSRS
6. HAVE YOU EVER DONE ANYTHING, STARTED TO DO ANYTHING, OR PREPARED TO DO ANYTHING TO END YOUR LIFE?: NO
1. IN THE PAST MONTH, HAVE YOU WISHED YOU WERE DEAD OR WISHED YOU COULD GO TO SLEEP AND NOT WAKE UP?: NO
2. HAVE YOU ACTUALLY HAD ANY THOUGHTS OF KILLING YOURSELF?: NO

## 2024-03-14 ASSESSMENT — PAIN SCALES - GENERAL: PAINLEVEL: 0-NO PAIN

## 2024-03-14 ASSESSMENT — PATIENT HEALTH QUESTIONNAIRE - PHQ9
1. LITTLE INTEREST OR PLEASURE IN DOING THINGS: NOT AT ALL
2. FEELING DOWN, DEPRESSED OR HOPELESS: NOT AT ALL
SUM OF ALL RESPONSES TO PHQ9 QUESTIONS 1 AND 2: 0

## 2024-03-14 NOTE — PROGRESS NOTES
Bariatric Post-Op Follow Up Appointment:    Starting Weight: 296 lbs   Current Weight: 257 lbs   Current BMI: 50.61  Percentage of weight loss achieved: 23% EWL at 3 months     Dietary Intake: 3 meals per day, most days getting 60 grams of protein   Breakfast- Protein shake or a protein bar   Lunch- lean cuisine or a protein bar   Dinner- 4 ounces of Fish or 2-3 ounces of chicken   Volume of food at a time: ~1/2 cup  Snacks: minimal. Sometimes a Greek yogurt or fat free cheese. Sugar free piece of candy or gum   Beverages: Tries to drink at least 50 ounces of fluid. Flavored water packets. Gatorade zero and propel.   Alcohol Intake: none   Do you drink with your meals? No  Current Exercise: 30 minutes on the exercise bike - aims for 5 days per week (15 min in the morning, 15 min in the evening)   Vitamins/minerals: Celebrate 45 MVI + calcium citrate BID (600 mg at a time)   Food intolerances? None mentioned   Any decrease in energy levels? No   Any questions or concerns? No     Jennifer Chan, MS, RD, LD     right

## 2024-03-14 NOTE — H&P
History Of Present Illness  Jennifer Lane is a 24 y.o. woman here for 3 month follow up after RYGB.  She has no reflux.  She is taking her ursodiol.  She went to the ED on Monday for ovarian cyst.  Her CT revealed gallstones found incidentally.  She is asymptomatic.  She was reminded to avoid ASA or NSAIDs.  She takes an mvi (celebrate 45), calcium citrate bid.  She did not have labwork for this visit.  She does not drink with her meals. Jennifer feels full with 4-6 oz of food at each meal. She will consume a protein shake or bar for breakfast and then lunch and dinner.  She is physically active for at least 30 minutes a minimum of five days per week.      3 MOS FUV RYGB  START WT: 296  IDEAL WT: 129   START EXCESS:167   TOTAL WT LOSS:           EWL:    23   % HT: 59.75  Past Medical History  Past Medical History:   Diagnosis Date    Acid reflux     Asthma        Surgical History  Past Surgical History:   Procedure Laterality Date    APPENDECTOMY      GASTRIC BYPASS  12/13/2023    ProcedurE Gastric Bypass Laparoscopic General Repair Diaphragmatic Hernia Laparoscopy    TONSILECTOMY, ADENOIDECTOMY, BILATERAL MYRINGOTOMY AND TUBES N/A     WISDOM TOOTH EXTRACTION          Social History  She reports that she has never smoked. She has never been exposed to tobacco smoke. She has never used smokeless tobacco. She reports that she does not currently use alcohol. She reports that she does not use drugs.    Family History  Family History   Problem Relation Name Age of Onset    Other (MORBID OBESITY) Mother      No Known Problems Father          Allergies  Patient has no known allergies.    Review of Systems   Bariatric Surgery F/U:          Seen at ER after surgery? No.  Hospital admission? No.  Bariatric reoperation? No.  Bariatric intervention? No.  Was anticoagulation initiated for presumed/confirmed Vein Thrombosis/PE? No.  Was an incisional hernia noted on exam? No.  Sleep Apnea? No.  Still using C-PAP? No.  GERD req.  meds? No.  Hyperlipidemia? No.  Still taking Cholesterol med? No.  Hypertension? No.  Still taking HTN med? No.  Number of Anti-hypertensive Medications: 0.  Diabetes? No.  Still taking DM med? No.  Current DM medication type: _____.         CONSTITUTIONAL:          Chills No.  Fatigue No.  Fever No.         CARDIOLOGY:          Negative for dizziness, chest pain, palpitations, shortness of breath.         RESPIRATORY:          Negative for chest congestion, cough, wheezing.         GASTROENTEROLOGY:          Food Intolerance No.  Acid reflux No.  Abdominal pain No.  Black stools No.  Constipation No.  Diarrhea No.  Loss of appetite no.  Nausea No.  Vomiting No.         UROLOGY:          Negative for dysuria, urinary urgency, kidney stones.         PSYCHOLOGY:          Negative for depression, anxiety, high stress level.         Last Recorded Vitals  There were no vitals taken for this visit.    Relevant Results     CT abdomen pelvis w IV contrast  Order: [id number]  Impression    IMPRESSION:    Trace fluid and stranding adjacent the right ovary raising possibility of  ruptured ovarian cyst; exclude pelvic inflammatory disease clinically.    Small left ovarian corpus luteum.    Subtle patchy right upper hepatic enhancement.  While most likely benign  in a patient of this age, recommend follow-up as below to differentiate  hemangioma from other lesion.    ACTIONABLE RESULT: FOLLOW-UP  Acuity: Actionable  Findings: Liver Routing Code:  LV_1  Recommendation: MRI LIVER WO/W IVCON (add Dotarem in comments)  Time Frame: Non-urgent, but prompt follow-up  COMMUNICATION: Results will be communicated with the ordering provider  via Epic staff message or phone message by [facility]  within 2 business days of report finalization.  --END OF FINDING--                      Transcribed Using Voice Recognition  Transcribe Date/Time: Mar 12 2024  1:25A    Dictated by: MARIA VICTORIA SHI MD    This examination was interpreted  and the report reviewed and  electronically signed by:  MARIA VICTORIA SHI MD on Mar 12 2024  1:41AM  EST  Narrative    * * *Final Report* * *    DATE OF EXAM: Mar 12 2024  1:24AM      [facility] [place]   0530  -  CT ABD/PEL W IVCON  / ACCESSION #  [id number]    PROCEDURE REASON: Abdominal pain, acute, nonlocalized        * * * * Physician Interpretation * * * *    RESULT: EXAMINATION:  CT ABDOMEN AND PELVIS WITH IV CONTRAST    CLINICAL HISTORY:    TECHNIQUE: CT of the abdomen and pelvis was performed using standard  technique, scanning from just above the dome of the diaphragm to the  symphysis pubis.  MQ:  CTAP_3    Contrast:  IV:  100 ml of Omnipaque 350  : ml of    CT Radiation dose: Integrated Dose-length product (DLP) for this visit =    971 mGy*cm.  CT Dose Reduction Employed: Automated exposure control(AEC) and iterative  recon    COMPARISON: None.      RESULT:    Solid abdominal organs: No acute abnormality.  Subtle patchy enhancement  in the right upper liver does not appear to follow vessel distribution;  difficult to measure, grossly approximately 4 cm.    Biliary: Cholelithiasis without acute cholecystitis..    GI tract and mesentery: No bowel obstruction or acute bowel inflammation.  Appendectomy. Chucho-en-Y gastric bypass.    Peritoneal cavity: Trace pelvic free fluid.    Retroperitoneum: Unremarkable.    Vasculature: Unremarkable.    Pelvis: Trace fluid and stranding in the right lower quadrant abutting  the right ovary.  No walled off fluid collection.  Ovaries not  significantly enlarged.  Small left ovarian corpus luteum.  Unremarkable  bladder.    Bones/Soft Tissues: No acute abnormality.    Lower thorax: Unremarkable.  All Measurements     Exam End: 03/12/24 01:24    Specimen Collected: 03/12/24 01:24 Last Resulted: 03/12/24 01:43   Received From: [facility]  Result Received: 03/14/24 14:14     Assessment/Plan   Problem List Items Addressed This Visit             ICD-10-CM    Malabsorption - Primary K90.9     Relevant Orders    CBC and Auto Differential    Comprehensive Metabolic Panel    Copper, Blood    Ferritin    Folate    Iron and TIBC    Parathyroid Hormone, Intact    Vitamin B1, Whole Blood    Vitamin B12    Vitamin D 1,25 Dihydroxy (for eval of hypercalcemia)    Zinc, Serum or Plasma    B12 deficiency E53.8    Relevant Orders    Vitamin B12    Vitamin D deficiency E55.9    Relevant Orders    Parathyroid Hormone, Intact    Vitamin D 1,25 Dihydroxy (for eval of hypercalcemia)    Hyperparathyroidism (CMS/HCC) E21.3    Relevant Orders    Parathyroid Hormone, Intact    Vitamin D 1,25 Dihydroxy (for eval of hypercalcemia)    S/P gastric bypass Z98.84    Calculus of gallbladder without cholecystitis without obstruction K80.20       We reviewed the rules necessary for long term success after weight loss surgery.  We discussed the need for lifelong nutritional supplementation after weight loss surgery and they were given a handout.  We will obtain labwork at next visit.  We discussed the importance of excercising with moderate intensity a minimum of five days per week for at least 30 minutes.  We reviewed the symptoms of gallbladder problems and the risks of not performing cholecystectomy in someone with gastric bypass anatomy.  She is to let us know if she develops right upper quadrant or right back pain symptoms, nausea or vomiting and we will evaluate for cholecystectomy.  I did ask her to obtain her labwork.       Cameron Everett MD

## 2024-06-07 NOTE — PROGRESS NOTES
Subjective   Patient ID: Jennifer Lane is a 24 y.o. female who presents for No chief complaint on file..  HPI  6 MOS FUV RYGB  START WT: 296  IDEAL WT: 129 START EXCESS WT: 167 TWL: 65 EWL:  39 %          HT: 59.75 IN   Review of Systems  Bariatric Surgery F/U:          Seen at ER after surgery? No.  Hospital admission? No.  Bariatric reoperation? No.  Bariatric intervention? No.  Was anticoagulation initiated for presumed/confirmed Vein Thrombosis/PE? No.  Was an incisional hernia noted on exam? No.  Sleep Apnea? No.  Still using C-PAP? No.  GERD req. meds? No.  Hyperlipidemia? No.  Still taking Cholesterol med? No.  Hypertension? No.  Still taking HTN med? No.  Number of Anti-hypertensive Medications: 0.  Diabetes? No.  Still taking DM med? No.  Current DM medication type: _____.         CONSTITUTIONAL:          Chills No.  Fatigue No.  Fever No.         CARDIOLOGY:          Negative for dizziness, chest pain, palpitations, shortness of breath.         RESPIRATORY:          Negative for chest congestion, cough, wheezing.         GASTROENTEROLOGY:          Food Intolerance No.  Acid reflux No.  Abdominal pain No.  Black stools No.  Constipation No.  Diarrhea No.  Loss of appetite no.  Nausea No.  Vomiting No.         UROLOGY:          Negative for dysuria, urinary urgency, kidney stones.         PSYCHOLOGY:          Negative for depression, anxiety, high stress level.   Objective   Physical Exam    Assessment/Plan            Lima Ryder LPN 06/07/24 2:25 PM

## 2024-06-13 ENCOUNTER — APPOINTMENT (OUTPATIENT)
Dept: SURGERY | Facility: CLINIC | Age: 24
End: 2024-06-13
Payer: COMMERCIAL

## 2024-06-13 ENCOUNTER — LAB (OUTPATIENT)
Dept: LAB | Facility: LAB | Age: 24
End: 2024-06-13
Payer: COMMERCIAL

## 2024-06-13 VITALS
BODY MASS INDEX: 45.35 KG/M2 | DIASTOLIC BLOOD PRESSURE: 72 MMHG | HEART RATE: 94 BPM | HEIGHT: 60 IN | SYSTOLIC BLOOD PRESSURE: 117 MMHG | WEIGHT: 231 LBS

## 2024-06-13 DIAGNOSIS — E53.8 B12 DEFICIENCY: ICD-10-CM

## 2024-06-13 DIAGNOSIS — E61.1 IRON DEFICIENCY: ICD-10-CM

## 2024-06-13 DIAGNOSIS — E53.8 B12 DEFICIENCY: Primary | ICD-10-CM

## 2024-06-13 DIAGNOSIS — K90.9 INTESTINAL MALABSORPTION, UNSPECIFIED TYPE (HHS-HCC): ICD-10-CM

## 2024-06-13 DIAGNOSIS — E21.3 HYPERPARATHYROIDISM (MULTI): ICD-10-CM

## 2024-06-13 DIAGNOSIS — Z98.84 S/P GASTRIC BYPASS: ICD-10-CM

## 2024-06-13 DIAGNOSIS — E55.9 VITAMIN D DEFICIENCY: ICD-10-CM

## 2024-06-13 LAB
ALBUMIN SERPL-MCNC: 4 G/DL (ref 3.5–5)
ALP BLD-CCNC: 111 U/L (ref 35–125)
ALT SERPL-CCNC: 14 U/L (ref 5–40)
ANION GAP SERPL CALC-SCNC: 13 MMOL/L
AST SERPL-CCNC: 19 U/L (ref 5–40)
BASOPHILS # BLD AUTO: 0.02 X10*3/UL (ref 0–0.1)
BASOPHILS NFR BLD AUTO: 0.3 %
BILIRUB SERPL-MCNC: 0.7 MG/DL (ref 0.1–1.2)
BUN SERPL-MCNC: 10 MG/DL (ref 8–25)
CALCIUM SERPL-MCNC: 9.9 MG/DL (ref 8.5–10.4)
CHLORIDE SERPL-SCNC: 101 MMOL/L (ref 97–107)
CO2 SERPL-SCNC: 26 MMOL/L (ref 24–31)
CREAT SERPL-MCNC: 0.6 MG/DL (ref 0.4–1.6)
EGFRCR SERPLBLD CKD-EPI 2021: >90 ML/MIN/1.73M*2
EOSINOPHIL # BLD AUTO: 0.08 X10*3/UL (ref 0–0.7)
EOSINOPHIL NFR BLD AUTO: 1.1 %
ERYTHROCYTE [DISTWIDTH] IN BLOOD BY AUTOMATED COUNT: 14.6 % (ref 11.5–14.5)
FERRITIN SERPL-MCNC: 24 NG/ML (ref 13–150)
FOLATE SERPL-MCNC: 16.8 NG/ML (ref 4.2–19.9)
GLUCOSE SERPL-MCNC: 79 MG/DL (ref 65–99)
HCT VFR BLD AUTO: 43.1 % (ref 36–46)
HGB BLD-MCNC: 14.1 G/DL (ref 12–16)
IMM GRANULOCYTES # BLD AUTO: 0.02 X10*3/UL (ref 0–0.7)
IMM GRANULOCYTES NFR BLD AUTO: 0.3 % (ref 0–0.9)
IRON SATN MFR SERPL: 9 % (ref 12–50)
IRON SERPL-MCNC: 33 UG/DL (ref 30–160)
LYMPHOCYTES # BLD AUTO: 2.13 X10*3/UL (ref 1.2–4.8)
LYMPHOCYTES NFR BLD AUTO: 29.7 %
MCH RBC QN AUTO: 30.7 PG (ref 26–34)
MCHC RBC AUTO-ENTMCNC: 32.7 G/DL (ref 32–36)
MCV RBC AUTO: 94 FL (ref 80–100)
MONOCYTES # BLD AUTO: 0.43 X10*3/UL (ref 0.1–1)
MONOCYTES NFR BLD AUTO: 6 %
NEUTROPHILS # BLD AUTO: 4.5 X10*3/UL (ref 1.2–7.7)
NEUTROPHILS NFR BLD AUTO: 62.6 %
NRBC BLD-RTO: 0 /100 WBCS (ref 0–0)
PLATELET # BLD AUTO: 339 X10*3/UL (ref 150–450)
POTASSIUM SERPL-SCNC: 4.6 MMOL/L (ref 3.4–5.1)
PROT SERPL-MCNC: 6.8 G/DL (ref 5.9–7.9)
PTH-INTACT SERPL-MCNC: 57.5 PG/ML (ref 18.5–88)
RBC # BLD AUTO: 4.59 X10*6/UL (ref 4–5.2)
SODIUM SERPL-SCNC: 140 MMOL/L (ref 133–145)
TIBC SERPL-MCNC: 363 UG/DL (ref 228–428)
UIBC SERPL-MCNC: 330 UG/DL (ref 110–370)
VIT B12 SERPL-MCNC: 1249 PG/ML (ref 211–946)
WBC # BLD AUTO: 7.2 X10*3/UL (ref 4.4–11.3)

## 2024-06-13 PROCEDURE — 82652 VIT D 1 25-DIHYDROXY: CPT

## 2024-06-13 PROCEDURE — 82525 ASSAY OF COPPER: CPT

## 2024-06-13 PROCEDURE — 83540 ASSAY OF IRON: CPT

## 2024-06-13 PROCEDURE — 84425 ASSAY OF VITAMIN B-1: CPT

## 2024-06-13 PROCEDURE — 84630 ASSAY OF ZINC: CPT

## 2024-06-13 PROCEDURE — 36415 COLL VENOUS BLD VENIPUNCTURE: CPT

## 2024-06-13 PROCEDURE — 83970 ASSAY OF PARATHORMONE: CPT

## 2024-06-13 PROCEDURE — 82728 ASSAY OF FERRITIN: CPT

## 2024-06-13 PROCEDURE — 82607 VITAMIN B-12: CPT

## 2024-06-13 PROCEDURE — 85025 COMPLETE CBC W/AUTO DIFF WBC: CPT

## 2024-06-13 PROCEDURE — 83550 IRON BINDING TEST: CPT

## 2024-06-13 PROCEDURE — 3008F BODY MASS INDEX DOCD: CPT

## 2024-06-13 PROCEDURE — 99214 OFFICE O/P EST MOD 30 MIN: CPT

## 2024-06-13 PROCEDURE — 82746 ASSAY OF FOLIC ACID SERUM: CPT

## 2024-06-13 PROCEDURE — 80053 COMPREHEN METABOLIC PANEL: CPT

## 2024-06-13 ASSESSMENT — COLUMBIA-SUICIDE SEVERITY RATING SCALE - C-SSRS
6. HAVE YOU EVER DONE ANYTHING, STARTED TO DO ANYTHING, OR PREPARED TO DO ANYTHING TO END YOUR LIFE?: NO
2. HAVE YOU ACTUALLY HAD ANY THOUGHTS OF KILLING YOURSELF?: NO
1. IN THE PAST MONTH, HAVE YOU WISHED YOU WERE DEAD OR WISHED YOU COULD GO TO SLEEP AND NOT WAKE UP?: NO

## 2024-06-13 ASSESSMENT — PATIENT HEALTH QUESTIONNAIRE - PHQ9
2. FEELING DOWN, DEPRESSED OR HOPELESS: NOT AT ALL
SUM OF ALL RESPONSES TO PHQ9 QUESTIONS 1 AND 2: 0
1. LITTLE INTEREST OR PLEASURE IN DOING THINGS: NOT AT ALL

## 2024-06-13 ASSESSMENT — PAIN SCALES - GENERAL: PAINLEVEL: 0-NO PAIN

## 2024-06-13 NOTE — PROGRESS NOTES
Subjective   Patient ID: Jennifer Lane is a 24 y.o. female who presents for Follow-up (6 MOS FUV RYGB).  NADJA Rodriguez is here for her 6 month follow up after a RYGB. She has lost 39% of her excess weight. She has no reflux. She does not feel hunger.  She is not measuring or weighing her food, but she tells me that she can eat a whole yogurt at once. She is walking for exercise. She is currently taking celebrate 18, calcium citrate BID, and b12. She had labwork for this appointment, which we reviewed.       Objective   Physical Exam  Constitutional:       Appearance: Normal appearance.   Eyes:      Pupils: Pupils are equal, round, and reactive to light.   Cardiovascular:      Rate and Rhythm: Normal rate.   Pulmonary:      Effort: Pulmonary effort is normal.   Abdominal:      Palpations: Abdomen is soft.   Musculoskeletal:         General: Normal range of motion.   Skin:     General: Skin is warm and dry.   Neurological:      General: No focal deficit present.      Mental Status: She is alert and oriented to person, place, and time.   Psychiatric:         Mood and Affect: Mood normal.        Latest Reference Range & Units 06/13/24 11:50   GLUCOSE 65 - 99 mg/dL 79   SODIUM 133 - 145 mmol/L 140   POTASSIUM 3.4 - 5.1 mmol/L 4.6   CHLORIDE 97 - 107 mmol/L 101   Bicarbonate 24 - 31 mmol/L 26   Anion Gap <=19 mmol/L 13   Blood Urea Nitrogen 8 - 25 mg/dL 10   Creatinine 0.40 - 1.60 mg/dL 0.60   EGFR >60 mL/min/1.73m*2 >90   Calcium 8.5 - 10.4 mg/dL 9.9   Albumin 3.5 - 5.0 g/dL 4.0   Alkaline Phosphatase 35 - 125 U/L 111   ALT 5 - 40 U/L 14   AST 5 - 40 U/L 19   Bilirubin Total 0.1 - 1.2 mg/dL 0.7   FERRITIN 13 - 150 ng/mL 24   FOLATE 4.2 - 19.9 ng/mL 16.8   Total Protein 5.9 - 7.9 g/dL 6.8   IRON 30 - 160 ug/dL 33   TIBC 228 - 428 ug/dL 363   UIBC 110 - 370 ug/dL 330   % Saturation 12 - 50 % 9 (L)   Vitamin B12 211 - 946 pg/mL 1,249 (H)   Parathyroid Hormone, Intact 18.5 - 88.0 pg/mL 57.5   LEUKOCYTES (10*3/UL) IN BLOOD BY  AUTOMATED COUNT, [place] 4.4 - 11.3 x10*3/uL 7.2   nRBC 0.0 - 0.0 /100 WBCs 0.0   ERYTHROCYTES (10*6/UL) IN BLOOD BY AUTOMATED COUNT, [place] 4.00 - 5.20 x10*6/uL 4.59   HEMOGLOBIN 12.0 - 16.0 g/dL 14.1   HEMATOCRIT 36.0 - 46.0 % 43.1   MCV 80 - 100 fL 94   MCH 26.0 - 34.0 pg 30.7   MCHC 32.0 - 36.0 g/dL 32.7   RED CELL DISTRIBUTION WIDTH 11.5 - 14.5 % 14.6 (H)   PLATELETS (10*3/UL) IN BLOOD AUTOMATED COUNT, [place] 150 - 450 x10*3/uL 339   NEUTROPHILS/100 LEUKOCYTES IN BLOOD BY AUTOMATED COUNT, [place] 40.0 - 80.0 % 62.6   Immature Granulocytes %, Automated 0.0 - 0.9 % 0.3   Lymphocytes % 13.0 - 44.0 % 29.7   Monocytes % 2.0 - 10.0 % 6.0   Eosinophils % 0.0 - 6.0 % 1.1   Basophils % 0.0 - 2.0 % 0.3   NEUTROPHILS (10*3/UL) IN BLOOD BY AUTOMATED COUNT, [place] 1.20 - 7.70 x10*3/uL 4.50   Immature Granulocytes Absolute, Automated 0.00 - 0.70 x10*3/uL 0.02   Lymphocytes Absolute 1.20 - 4.80 x10*3/uL 2.13   Monocytes Absolute 0.10 - 1.00 x10*3/uL 0.43   Eosinophils Absolute 0.00 - 0.70 x10*3/uL 0.08   Basophils Absolute 0.00 - 0.10 x10*3/uL 0.02       Assessment/Plan   Problem List Items Addressed This Visit             ICD-10-CM    Malabsorption ([facility]-HCC) K90.9    Relevant Orders    CBC and Auto Differential    Comprehensive Metabolic Panel    Copper, Blood    Ferritin    Folate    Iron and TIBC    Parathyroid Hormone, Intact    Vitamin B1, Whole Blood    Vitamin B12    Vitamin D 25-Hydroxy,Total (for eval of Vitamin D levels)    Zinc, Serum or Plasma    B12 deficiency - Primary E53.8    Relevant Orders    Vitamin B12    Vitamin D deficiency E55.9    Relevant Orders    Vitamin D 25-Hydroxy,Total (for eval of Vitamin D levels)    Hyperparathyroidism (Multi) E21.3    Relevant Orders    Parathyroid Hormone, Intact    S/P gastric bypass Z98.84    Iron deficiency E61.1    Relevant Orders    Ferritin    Iron and TIBC     Jennifer and I discussed adding resistance training to help with  her weight loss. I reminded her that  a RYGB is not compatible with ASA or NSAID use. I asked Jennifer to either add an iron supplement to her MVI or to increase her MVI to the celebrate 45. We will recheck her labwork at her next follow up in 6 months.     Claudette Payton, JONATHAN-CNP 06/13/24 2:56 PM

## 2024-06-14 PROBLEM — E61.1 IRON DEFICIENCY: Status: ACTIVE | Noted: 2024-06-14

## 2024-06-16 LAB — 1,25(OH)2D3 SERPL-MCNC: 46.6 PG/ML (ref 19.9–79.3)

## 2024-06-17 DIAGNOSIS — K90.9 INTESTINAL MALABSORPTION, UNSPECIFIED TYPE (HHS-HCC): Primary | ICD-10-CM

## 2024-06-19 LAB — VIT B1 PYROPHOSHATE BLD-SCNC: 147 NMOL/L (ref 70–180)

## 2024-06-21 ENCOUNTER — TELEPHONE (OUTPATIENT)
Dept: SURGERY | Facility: CLINIC | Age: 24
End: 2024-06-21
Payer: COMMERCIAL

## 2024-07-26 ENCOUNTER — TELEPHONE (OUTPATIENT)
Dept: SURGERY | Facility: CLINIC | Age: 24
End: 2024-07-26
Payer: COMMERCIAL

## (undated) DEVICE — STAPLER, ENDO ECHELON 45MM RELOAD, WHITE, REUSABLE

## (undated) DEVICE — APPLIER, LIGACLIP ENDO ROTATING, MULTIPLE CLIP, 20 LG

## (undated) DEVICE — STAPLER,  ENDO ECHELON 45MM RELOAD, BLUE

## (undated) DEVICE — SYRINGE, 10 CC, LUER LOCK

## (undated) DEVICE — CLIP, ABSORBABLE, VICRYL, LAPRA-TY, VIOLET

## (undated) DEVICE — APPLICATOR, DUAL LAPAROSCOPIC, VISTASEAL, 35CM, RIGID

## (undated) DEVICE — ADHESIVE, SKIN, DERMABOND ADVANCED, 15CM, PEN-STYLE

## (undated) DEVICE — SHEARS, HARMONIC 5 X 36 CVD ACE+7

## (undated) DEVICE — GLOVE, SURGICAL, PROTEXIS PI , 6.5, PF, LF

## (undated) DEVICE — DISSECTOR, KITTNER, PEANUT, 5MM

## (undated) DEVICE — SUTURE, VICRYL, 3-0, 27 IN, SH, VIOLET

## (undated) DEVICE — APPLICATOR, CHLORAPREP, W/ORANGE TINT, 26ML

## (undated) DEVICE — Device

## (undated) DEVICE — MARKER, SKIN, VISFLOW BOLD TIP, W/RULER

## (undated) DEVICE — SLEEVE, KII, Z-THREAD, 12X100CM

## (undated) DEVICE — SUTURE, MONOCRYL, 4-0, 27 IN, PS-2, UNDYED

## (undated) DEVICE — SUTURE, ETHIBOND, XTRA, 2-0, GREEN

## (undated) DEVICE — SOLUTION, INJECTION, USP, LACTATED RINGERS, LIFECARE, 1000ML

## (undated) DEVICE — ENDO, TROCAR 12 X 100 BLADELESS, W / STABILITY SLEEVE

## (undated) DEVICE — SPONGE, LAP, X-RAY, RF DETECT, 18 X 18IN, STERILE

## (undated) DEVICE — SLEEVE, KII, Z-THREAD, 5X100CM

## (undated) DEVICE — MARKER, SKIN, DUAL TIP, W/RULER AND LABEL

## (undated) DEVICE — SUTURE, VICRYL, 0, SH 27 TAPER NEEDLE, UNDYED, BRAIDED

## (undated) DEVICE — DRAIN, PENROSE, 1/4 IN X 18 IN, STERILE, LF

## (undated) DEVICE — SOLUTION, IRRIGATION, X RX SODIUM CHL 0.9%, 1000ML BTL

## (undated) DEVICE — SEALANT, FIBRIN, VISTASEAL HUMAN, 4ML, 12/PK

## (undated) DEVICE — TUBE SET, PNEUMOLAR HEATED, SMOKE EVACU, HIGH-FLOW

## (undated) DEVICE — DRAPE, SHEET, UTILITY, NON ABSORBENT, 18 X 26 IN, LF

## (undated) DEVICE — IRRIGATOR, WOUND, HYDRO SURG PLUS, W/O TIP, DISP

## (undated) DEVICE — WATER STERILE, FOR IRRIGATION, 1000ML, W/HANGER

## (undated) DEVICE — SUTURE, VICRYL, 2-0, 27 IN, BR/SH 27, VIOLET

## (undated) DEVICE — SPONGE, HEMOSTATIC, CELLULOSE, SURGICEL, 4 X 8 IN

## (undated) DEVICE — SUTURE, VICRYL, 0, 36 IN, CT-1, UNDYED

## (undated) DEVICE — APPLIER,  LIGACLIP, ENDO ROTATE, ROTATING, 10MM 20M/L, DISP

## (undated) DEVICE — 5MM X 31CM  AESCULAP HOOK SCISSOR INSERT, DISPOSABLE

## (undated) DEVICE — GLOVE, SURGICAL, PROTEXIS PI , 7.0, PF, LF

## (undated) DEVICE — STAPLER, EF POWERED PLUS, CUTTER 340MM, 45MM EFFECTOR, DISP

## (undated) DEVICE — TK KNOT QUICK LOAD

## (undated) DEVICE — TK TI-KNOT DEVICE

## (undated) DEVICE — TROCAR, KII OPTICAL BLADELESS 5MM Z THREAD 100MM LNGTH

## (undated) DEVICE — CARE KIT, LAPAROSCOPIC, ADVANCED

## (undated) DEVICE — SOLUTION, INJECTION, 0.9% SODIUM CHL, USP LIFECARE 1000 MI

## (undated) DEVICE — TROCAR, OPTICAL, BLADELESS, 12MM, THREADED, 100MM LENGTH

## (undated) DEVICE — GLOVE, SURGICAL, PROTEXIS PI BLUE W/NEUTHERA, 6.5, PF, LF